# Patient Record
Sex: MALE | Race: WHITE | NOT HISPANIC OR LATINO | Employment: FULL TIME | URBAN - METROPOLITAN AREA
[De-identification: names, ages, dates, MRNs, and addresses within clinical notes are randomized per-mention and may not be internally consistent; named-entity substitution may affect disease eponyms.]

---

## 2017-09-19 PROBLEM — M54.12 CERVICAL RADICULOPATHY: Status: ACTIVE | Noted: 2017-09-19

## 2017-09-21 ENCOUNTER — HOSPITAL ENCOUNTER (OUTPATIENT)
Dept: RADIOLOGY | Facility: MEDICAL CENTER | Age: 42
End: 2017-09-21

## 2017-10-11 PROBLEM — M54.2 CERVICAL SPINE PAIN: Status: ACTIVE | Noted: 2017-10-11

## 2018-01-08 ENCOUNTER — HOSPITAL ENCOUNTER (OUTPATIENT)
Dept: HOSPITAL 8 - STAR | Age: 43
Discharge: HOME | End: 2018-01-08
Attending: NEUROLOGICAL SURGERY
Payer: COMMERCIAL

## 2018-01-08 DIAGNOSIS — M48.061: ICD-10-CM

## 2018-01-08 DIAGNOSIS — Z01.818: Primary | ICD-10-CM

## 2018-01-08 LAB
ALBUMIN SERPL-MCNC: 4 G/DL (ref 3.4–5)
ALP SERPL-CCNC: 59 U/L (ref 45–117)
ALT SERPL-CCNC: 37 U/L (ref 12–78)
ANION GAP SERPL CALC-SCNC: 5 MMOL/L (ref 5–15)
APTT BLD: 28 SECONDS (ref 25–31)
BASOPHILS # BLD AUTO: 0.04 X10^3/UL (ref 0–0.1)
BASOPHILS NFR BLD AUTO: 1 % (ref 0–1)
BILIRUB SERPL-MCNC: 1.1 MG/DL (ref 0.2–1)
CALCIUM SERPL-MCNC: 9.1 MG/DL (ref 8.5–10.1)
CHLORIDE SERPL-SCNC: 105 MMOL/L (ref 98–107)
CREAT SERPL-MCNC: 1.02 MG/DL (ref 0.7–1.3)
EOSINOPHIL # BLD AUTO: 0.22 X10^3/UL (ref 0–0.4)
EOSINOPHIL NFR BLD AUTO: 3 % (ref 1–7)
ERYTHROCYTE [DISTWIDTH] IN BLOOD BY AUTOMATED COUNT: 12.4 % (ref 9.4–14.8)
INR PPP: 1.01 (ref 0.93–1.1)
LYMPHOCYTES # BLD AUTO: 1.28 X10^3/UL (ref 1–3.4)
LYMPHOCYTES NFR BLD AUTO: 18 % (ref 22–44)
MCH RBC QN AUTO: 29.8 PG (ref 27.5–34.5)
MCHC RBC AUTO-ENTMCNC: 34.5 G/DL (ref 33.2–36.2)
MCV RBC AUTO: 86.4 FL (ref 81–97)
MD: NO
MONOCYTES # BLD AUTO: 0.61 X10^3/UL (ref 0.2–0.8)
MONOCYTES NFR BLD AUTO: 8 % (ref 2–9)
NEUTROPHILS # BLD AUTO: 5.06 X10^3/UL (ref 1.8–6.8)
NEUTROPHILS NFR BLD AUTO: 70 % (ref 42–75)
PLATELET # BLD AUTO: 280 X10^3/UL (ref 130–400)
PMV BLD AUTO: 7.3 FL (ref 7.4–10.4)
PROT SERPL-MCNC: 8 G/DL (ref 6.4–8.2)
PROTHROMBIN TIME: 10.5 SECONDS (ref 9.6–11.5)
RBC # BLD AUTO: 5.59 X10^6/UL (ref 4.38–5.82)

## 2018-01-08 PROCEDURE — 80053 COMPREHEN METABOLIC PANEL: CPT

## 2018-01-08 PROCEDURE — 85025 COMPLETE CBC W/AUTO DIFF WBC: CPT

## 2018-01-08 PROCEDURE — 93005 ELECTROCARDIOGRAM TRACING: CPT

## 2018-01-08 PROCEDURE — 71046 X-RAY EXAM CHEST 2 VIEWS: CPT

## 2018-01-08 PROCEDURE — 85730 THROMBOPLASTIN TIME PARTIAL: CPT

## 2018-01-08 PROCEDURE — 85610 PROTHROMBIN TIME: CPT

## 2018-01-08 PROCEDURE — 36415 COLL VENOUS BLD VENIPUNCTURE: CPT

## 2018-02-21 ENCOUNTER — HOSPITAL ENCOUNTER (INPATIENT)
Dept: HOSPITAL 8 - ORIP | Age: 43
LOS: 4 days | Discharge: HOME | DRG: 455 | End: 2018-02-25
Attending: NEUROLOGICAL SURGERY | Admitting: NEUROLOGICAL SURGERY
Payer: COMMERCIAL

## 2018-02-21 VITALS — DIASTOLIC BLOOD PRESSURE: 89 MMHG | SYSTOLIC BLOOD PRESSURE: 137 MMHG

## 2018-02-21 VITALS — WEIGHT: 269.63 LBS | HEIGHT: 75 IN | BODY MASS INDEX: 33.52 KG/M2

## 2018-02-21 VITALS — DIASTOLIC BLOOD PRESSURE: 64 MMHG | SYSTOLIC BLOOD PRESSURE: 101 MMHG

## 2018-02-21 DIAGNOSIS — Z79.899: ICD-10-CM

## 2018-02-21 DIAGNOSIS — Z88.8: ICD-10-CM

## 2018-02-21 DIAGNOSIS — M48.07: ICD-10-CM

## 2018-02-21 DIAGNOSIS — M51.17: Primary | ICD-10-CM

## 2018-02-21 DIAGNOSIS — K59.00: ICD-10-CM

## 2018-02-21 DIAGNOSIS — M47.817: ICD-10-CM

## 2018-02-21 PROCEDURE — C1776 JOINT DEVICE (IMPLANTABLE): HCPCS

## 2018-02-21 PROCEDURE — 0SB40ZZ EXCISION OF LUMBOSACRAL DISC, OPEN APPROACH: ICD-10-PCS | Performed by: SURGERY

## 2018-02-21 PROCEDURE — 0SG00A0 FUSION OF LUMBAR VERTEBRAL JOINT WITH INTERBODY FUSION DEVICE, ANTERIOR APPROACH, ANTERIOR COLUMN, OPEN APPROACH: ICD-10-PCS | Performed by: NEUROLOGICAL SURGERY

## 2018-02-21 PROCEDURE — 0SG30J1 FUSION OF LUMBOSACRAL JOINT WITH SYNTHETIC SUBSTITUTE, POSTERIOR APPROACH, POSTERIOR COLUMN, OPEN APPROACH: ICD-10-PCS | Performed by: NEUROLOGICAL SURGERY

## 2018-02-21 PROCEDURE — 72100 X-RAY EXAM L-S SPINE 2/3 VWS: CPT

## 2018-02-21 PROCEDURE — C1713 ANCHOR/SCREW BN/BN,TIS/BN: HCPCS

## 2018-02-21 PROCEDURE — 36415 COLL VENOUS BLD VENIPUNCTURE: CPT

## 2018-02-21 PROCEDURE — 86900 BLOOD TYPING SEROLOGIC ABO: CPT

## 2018-02-21 PROCEDURE — 0SB20ZZ EXCISION OF LUMBAR VERTEBRAL DISC, OPEN APPROACH: ICD-10-PCS | Performed by: SURGERY

## 2018-02-21 PROCEDURE — C1762 CONN TISS, HUMAN(INC FASCIA): HCPCS

## 2018-02-21 PROCEDURE — 01NB0ZZ RELEASE LUMBAR NERVE, OPEN APPROACH: ICD-10-PCS | Performed by: SURGERY

## 2018-02-21 PROCEDURE — 0SG30A0 FUSION OF LUMBOSACRAL JOINT WITH INTERBODY FUSION DEVICE, ANTERIOR APPROACH, ANTERIOR COLUMN, OPEN APPROACH: ICD-10-PCS | Performed by: SURGERY

## 2018-02-21 PROCEDURE — 0SG00J1 FUSION OF LUMBAR VERTEBRAL JOINT WITH SYNTHETIC SUBSTITUTE, POSTERIOR APPROACH, POSTERIOR COLUMN, OPEN APPROACH: ICD-10-PCS | Performed by: NEUROLOGICAL SURGERY

## 2018-02-21 PROCEDURE — 86850 RBC ANTIBODY SCREEN: CPT

## 2018-02-21 PROCEDURE — 74018 RADEX ABDOMEN 1 VIEW: CPT

## 2018-02-21 RX ADMIN — HYDROMORPHONE HYDROCHLORIDE PRN MG: 2 INJECTION INTRAMUSCULAR; INTRAVENOUS; SUBCUTANEOUS at 14:14

## 2018-02-21 RX ADMIN — FENTANYL CITRATE PRN MCG: 50 INJECTION INTRAMUSCULAR; INTRAVENOUS at 12:05

## 2018-02-21 RX ADMIN — HYDROMORPHONE HYDROCHLORIDE PRN MG: 1 INJECTION, SOLUTION INTRAMUSCULAR; INTRAVENOUS; SUBCUTANEOUS at 13:07

## 2018-02-21 RX ADMIN — SODIUM CHLORIDE AND POTASSIUM CHLORIDE SCH MLS/HR: .9; .15 SOLUTION INTRAVENOUS at 14:49

## 2018-02-21 RX ADMIN — HYDROCODONE BITARTRATE AND ACETAMINOPHEN PRN TAB: 10; 325 TABLET ORAL at 16:48

## 2018-02-21 RX ADMIN — CEFAZOLIN SODIUM SCH MLS/HR: 1 SOLUTION INTRAVENOUS at 16:41

## 2018-02-21 RX ADMIN — HYDROMORPHONE HYDROCHLORIDE PRN MG: 1 INJECTION, SOLUTION INTRAMUSCULAR; INTRAVENOUS; SUBCUTANEOUS at 12:44

## 2018-02-21 RX ADMIN — FENTANYL CITRATE PRN MCG: 50 INJECTION INTRAMUSCULAR; INTRAVENOUS at 11:53

## 2018-02-21 RX ADMIN — METHOCARBAMOL SCH MLS/HR: 100 INJECTION INTRAMUSCULAR; INTRAVENOUS at 22:07

## 2018-02-21 RX ADMIN — HYDROMORPHONE HYDROCHLORIDE PRN MG: 2 INJECTION INTRAMUSCULAR; INTRAVENOUS; SUBCUTANEOUS at 20:27

## 2018-02-22 VITALS — DIASTOLIC BLOOD PRESSURE: 68 MMHG | SYSTOLIC BLOOD PRESSURE: 108 MMHG

## 2018-02-22 VITALS — DIASTOLIC BLOOD PRESSURE: 59 MMHG | SYSTOLIC BLOOD PRESSURE: 102 MMHG

## 2018-02-22 VITALS — DIASTOLIC BLOOD PRESSURE: 68 MMHG | SYSTOLIC BLOOD PRESSURE: 106 MMHG

## 2018-02-22 VITALS — DIASTOLIC BLOOD PRESSURE: 80 MMHG | SYSTOLIC BLOOD PRESSURE: 144 MMHG

## 2018-02-22 VITALS — DIASTOLIC BLOOD PRESSURE: 76 MMHG | SYSTOLIC BLOOD PRESSURE: 129 MMHG

## 2018-02-22 RX ADMIN — HYDROCODONE BITARTRATE AND ACETAMINOPHEN PRN TAB: 10; 325 TABLET ORAL at 19:50

## 2018-02-22 RX ADMIN — DIPHENHYDRAMINE HYDROCHLORIDE PRN MG: 50 INJECTION, SOLUTION INTRAMUSCULAR; INTRAVENOUS at 20:03

## 2018-02-22 RX ADMIN — PREGABALIN SCH MG: 25 CAPSULE ORAL at 09:52

## 2018-02-22 RX ADMIN — PREGABALIN SCH MG: 25 CAPSULE ORAL at 15:56

## 2018-02-22 RX ADMIN — METHOCARBAMOL SCH MLS/HR: 100 INJECTION INTRAMUSCULAR; INTRAVENOUS at 22:12

## 2018-02-22 RX ADMIN — SODIUM CHLORIDE AND POTASSIUM CHLORIDE SCH MLS/HR: .9; .15 SOLUTION INTRAVENOUS at 05:21

## 2018-02-22 RX ADMIN — HYDROCODONE BITARTRATE AND ACETAMINOPHEN PRN TAB: 10; 325 TABLET ORAL at 02:12

## 2018-02-22 RX ADMIN — METHOCARBAMOL SCH MLS/HR: 100 INJECTION INTRAMUSCULAR; INTRAVENOUS at 06:06

## 2018-02-22 RX ADMIN — CEFAZOLIN SODIUM SCH MLS/HR: 1 SOLUTION INTRAVENOUS at 00:05

## 2018-02-22 RX ADMIN — HYDROCODONE BITARTRATE AND ACETAMINOPHEN PRN TAB: 10; 325 TABLET ORAL at 12:06

## 2018-02-22 RX ADMIN — PREGABALIN SCH MG: 25 CAPSULE ORAL at 19:51

## 2018-02-22 RX ADMIN — ENOXAPARIN SODIUM SCH MG: 30 INJECTION SUBCUTANEOUS at 12:06

## 2018-02-22 RX ADMIN — SODIUM CHLORIDE AND POTASSIUM CHLORIDE SCH MLS/HR: .9; .15 SOLUTION INTRAVENOUS at 15:58

## 2018-02-22 RX ADMIN — HYDROCODONE BITARTRATE AND ACETAMINOPHEN PRN TAB: 10; 325 TABLET ORAL at 07:51

## 2018-02-22 RX ADMIN — METHOCARBAMOL SCH MLS/HR: 100 INJECTION INTRAMUSCULAR; INTRAVENOUS at 15:11

## 2018-02-22 RX ADMIN — HYDROCODONE BITARTRATE AND ACETAMINOPHEN PRN TAB: 10; 325 TABLET ORAL at 15:56

## 2018-02-22 RX ADMIN — DOCUSATE SODIUM 50MG AND SENNOSIDES 8.6MG SCH TAB: 8.6; 5 TABLET, FILM COATED ORAL at 07:51

## 2018-02-23 VITALS — DIASTOLIC BLOOD PRESSURE: 77 MMHG | SYSTOLIC BLOOD PRESSURE: 124 MMHG

## 2018-02-23 VITALS — DIASTOLIC BLOOD PRESSURE: 58 MMHG | SYSTOLIC BLOOD PRESSURE: 116 MMHG

## 2018-02-23 VITALS — DIASTOLIC BLOOD PRESSURE: 73 MMHG | SYSTOLIC BLOOD PRESSURE: 123 MMHG

## 2018-02-23 VITALS — DIASTOLIC BLOOD PRESSURE: 73 MMHG | SYSTOLIC BLOOD PRESSURE: 119 MMHG

## 2018-02-23 RX ADMIN — HYDROCODONE BITARTRATE AND ACETAMINOPHEN PRN TAB: 10; 325 TABLET ORAL at 20:57

## 2018-02-23 RX ADMIN — ENOXAPARIN SODIUM SCH MG: 30 INJECTION SUBCUTANEOUS at 11:17

## 2018-02-23 RX ADMIN — HYDROCODONE BITARTRATE AND ACETAMINOPHEN PRN TAB: 10; 325 TABLET ORAL at 05:28

## 2018-02-23 RX ADMIN — PREGABALIN SCH MG: 25 CAPSULE ORAL at 16:56

## 2018-02-23 RX ADMIN — METHOCARBAMOL SCH MLS/HR: 100 INJECTION INTRAMUSCULAR; INTRAVENOUS at 14:18

## 2018-02-23 RX ADMIN — HYDROCODONE BITARTRATE AND ACETAMINOPHEN PRN TAB: 10; 325 TABLET ORAL at 16:56

## 2018-02-23 RX ADMIN — MAGNESIUM HYDROXIDE PRN ML: 1200 SUSPENSION ORAL at 20:57

## 2018-02-23 RX ADMIN — PREGABALIN SCH MG: 25 CAPSULE ORAL at 20:57

## 2018-02-23 RX ADMIN — DIPHENHYDRAMINE HYDROCHLORIDE PRN MG: 50 INJECTION, SOLUTION INTRAMUSCULAR; INTRAVENOUS at 20:57

## 2018-02-23 RX ADMIN — SODIUM CHLORIDE AND POTASSIUM CHLORIDE SCH MLS/HR: .9; .15 SOLUTION INTRAVENOUS at 14:30

## 2018-02-23 RX ADMIN — METHOCARBAMOL SCH MG: 750 TABLET ORAL at 20:57

## 2018-02-23 RX ADMIN — SODIUM CHLORIDE AND POTASSIUM CHLORIDE SCH MLS/HR: .9; .15 SOLUTION INTRAVENOUS at 00:35

## 2018-02-23 RX ADMIN — PREGABALIN SCH MG: 25 CAPSULE ORAL at 09:47

## 2018-02-23 RX ADMIN — METHOCARBAMOL SCH MLS/HR: 100 INJECTION INTRAMUSCULAR; INTRAVENOUS at 05:29

## 2018-02-23 RX ADMIN — HYDROCODONE BITARTRATE AND ACETAMINOPHEN PRN TAB: 10; 325 TABLET ORAL at 11:14

## 2018-02-23 RX ADMIN — ENOXAPARIN SODIUM SCH MG: 30 INJECTION SUBCUTANEOUS at 00:35

## 2018-02-23 RX ADMIN — HYDROCODONE BITARTRATE AND ACETAMINOPHEN PRN TAB: 10; 325 TABLET ORAL at 00:35

## 2018-02-23 RX ADMIN — DOCUSATE SODIUM 50MG AND SENNOSIDES 8.6MG SCH TAB: 8.6; 5 TABLET, FILM COATED ORAL at 09:48

## 2018-02-24 VITALS — DIASTOLIC BLOOD PRESSURE: 73 MMHG | SYSTOLIC BLOOD PRESSURE: 109 MMHG

## 2018-02-24 VITALS — DIASTOLIC BLOOD PRESSURE: 77 MMHG | SYSTOLIC BLOOD PRESSURE: 125 MMHG

## 2018-02-24 VITALS — SYSTOLIC BLOOD PRESSURE: 141 MMHG | DIASTOLIC BLOOD PRESSURE: 87 MMHG

## 2018-02-24 VITALS — DIASTOLIC BLOOD PRESSURE: 59 MMHG | SYSTOLIC BLOOD PRESSURE: 116 MMHG

## 2018-02-24 VITALS — DIASTOLIC BLOOD PRESSURE: 72 MMHG | SYSTOLIC BLOOD PRESSURE: 119 MMHG

## 2018-02-24 RX ADMIN — ENOXAPARIN SODIUM SCH MG: 30 INJECTION SUBCUTANEOUS at 22:48

## 2018-02-24 RX ADMIN — DIPHENHYDRAMINE HYDROCHLORIDE PRN MG: 25 CAPSULE ORAL at 21:25

## 2018-02-24 RX ADMIN — HYDROCODONE BITARTRATE AND ACETAMINOPHEN PRN TAB: 10; 325 TABLET ORAL at 00:47

## 2018-02-24 RX ADMIN — METHOCARBAMOL SCH MG: 750 TABLET ORAL at 12:38

## 2018-02-24 RX ADMIN — SODIUM CHLORIDE AND POTASSIUM CHLORIDE SCH MLS/HR: .9; .15 SOLUTION INTRAVENOUS at 00:29

## 2018-02-24 RX ADMIN — ENOXAPARIN SODIUM SCH MG: 30 INJECTION SUBCUTANEOUS at 00:29

## 2018-02-24 RX ADMIN — HYDROCODONE BITARTRATE AND ACETAMINOPHEN PRN TAB: 10; 325 TABLET ORAL at 12:39

## 2018-02-24 RX ADMIN — HYDROCODONE BITARTRATE AND ACETAMINOPHEN PRN TAB: 10; 325 TABLET ORAL at 05:55

## 2018-02-24 RX ADMIN — PREGABALIN SCH MG: 25 CAPSULE ORAL at 16:57

## 2018-02-24 RX ADMIN — HYDROCODONE BITARTRATE AND ACETAMINOPHEN PRN TAB: 10; 325 TABLET ORAL at 14:58

## 2018-02-24 RX ADMIN — PREGABALIN SCH MG: 25 CAPSULE ORAL at 10:04

## 2018-02-24 RX ADMIN — PREGABALIN SCH MG: 25 CAPSULE ORAL at 21:25

## 2018-02-24 RX ADMIN — SODIUM CHLORIDE AND POTASSIUM CHLORIDE SCH MLS/HR: .9; .15 SOLUTION INTRAVENOUS at 14:18

## 2018-02-24 RX ADMIN — ENOXAPARIN SODIUM SCH MG: 30 INJECTION SUBCUTANEOUS at 12:39

## 2018-02-24 RX ADMIN — HYDROCODONE BITARTRATE AND ACETAMINOPHEN PRN TAB: 10; 325 TABLET ORAL at 22:48

## 2018-02-24 RX ADMIN — DIPHENHYDRAMINE HYDROCHLORIDE PRN MG: 25 CAPSULE ORAL at 22:48

## 2018-02-24 RX ADMIN — METHOCARBAMOL SCH MG: 750 TABLET ORAL at 21:25

## 2018-02-24 RX ADMIN — METHOCARBAMOL SCH MG: 750 TABLET ORAL at 05:51

## 2018-02-24 RX ADMIN — HYDROCODONE BITARTRATE AND ACETAMINOPHEN PRN TAB: 10; 325 TABLET ORAL at 10:04

## 2018-02-24 RX ADMIN — HYDROCODONE BITARTRATE AND ACETAMINOPHEN PRN TAB: 10; 325 TABLET ORAL at 18:31

## 2018-02-24 RX ADMIN — MAGNESIUM HYDROXIDE PRN ML: 1200 SUSPENSION ORAL at 10:04

## 2018-02-24 RX ADMIN — DOCUSATE SODIUM 50MG AND SENNOSIDES 8.6MG SCH TAB: 8.6; 5 TABLET, FILM COATED ORAL at 10:04

## 2018-02-25 VITALS — DIASTOLIC BLOOD PRESSURE: 69 MMHG | SYSTOLIC BLOOD PRESSURE: 105 MMHG

## 2018-02-25 VITALS — DIASTOLIC BLOOD PRESSURE: 76 MMHG | SYSTOLIC BLOOD PRESSURE: 122 MMHG

## 2018-02-25 VITALS — DIASTOLIC BLOOD PRESSURE: 73 MMHG | SYSTOLIC BLOOD PRESSURE: 119 MMHG

## 2018-02-25 RX ADMIN — METHOCARBAMOL SCH MG: 750 TABLET ORAL at 05:02

## 2018-02-25 RX ADMIN — SODIUM CHLORIDE AND POTASSIUM CHLORIDE SCH MLS/HR: .9; .15 SOLUTION INTRAVENOUS at 01:51

## 2018-02-25 RX ADMIN — DOCUSATE SODIUM 50MG AND SENNOSIDES 8.6MG SCH TAB: 8.6; 5 TABLET, FILM COATED ORAL at 09:26

## 2018-02-25 RX ADMIN — HYDROCODONE BITARTRATE AND ACETAMINOPHEN PRN TAB: 10; 325 TABLET ORAL at 12:32

## 2018-02-25 RX ADMIN — ENOXAPARIN SODIUM SCH MG: 30 INJECTION SUBCUTANEOUS at 11:30

## 2018-02-25 RX ADMIN — PREGABALIN SCH MG: 25 CAPSULE ORAL at 09:27

## 2018-02-25 RX ADMIN — HYDROCODONE BITARTRATE AND ACETAMINOPHEN PRN TAB: 10; 325 TABLET ORAL at 11:30

## 2018-02-25 RX ADMIN — HYDROCODONE BITARTRATE AND ACETAMINOPHEN PRN TAB: 10; 325 TABLET ORAL at 05:02

## 2020-02-13 ENCOUNTER — APPOINTMENT (RX ONLY)
Dept: URBAN - METROPOLITAN AREA CLINIC 4 | Facility: CLINIC | Age: 45
Setting detail: DERMATOLOGY
End: 2020-02-13

## 2020-02-13 DIAGNOSIS — L82.1 OTHER SEBORRHEIC KERATOSIS: ICD-10-CM

## 2020-02-13 DIAGNOSIS — L72.8 OTHER FOLLICULAR CYSTS OF THE SKIN AND SUBCUTANEOUS TISSUE: ICD-10-CM

## 2020-02-13 DIAGNOSIS — L91.8 OTHER HYPERTROPHIC DISORDERS OF THE SKIN: ICD-10-CM

## 2020-02-13 PROBLEM — D48.5 NEOPLASM OF UNCERTAIN BEHAVIOR OF SKIN: Status: ACTIVE | Noted: 2020-02-13

## 2020-02-13 PROCEDURE — 11102 TANGNTL BX SKIN SINGLE LES: CPT

## 2020-02-13 PROCEDURE — ? ADDITIONAL NOTES

## 2020-02-13 PROCEDURE — ? COUNSELING

## 2020-02-13 PROCEDURE — ? BIOPSY BY SHAVE METHOD

## 2020-02-13 PROCEDURE — 99202 OFFICE O/P NEW SF 15 MIN: CPT | Mod: 25

## 2020-02-13 ASSESSMENT — LOCATION SIMPLE DESCRIPTION DERM
LOCATION SIMPLE: LEFT AXILLARY VAULT
LOCATION SIMPLE: LEFT PRETIBIAL REGION
LOCATION SIMPLE: SCALP

## 2020-02-13 ASSESSMENT — LOCATION DETAILED DESCRIPTION DERM
LOCATION DETAILED: LEFT PROXIMAL PRETIBIAL REGION
LOCATION DETAILED: LEFT AXILLARY VAULT
LOCATION DETAILED: LEFT CENTRAL PARIETAL SCALP

## 2020-02-13 ASSESSMENT — LOCATION ZONE DERM
LOCATION ZONE: SCALP
LOCATION ZONE: LEG
LOCATION ZONE: AXILLAE

## 2020-02-13 NOTE — PROCEDURE: MIPS QUALITY
Quality 110: Preventive Care And Screening: Influenza Immunization: Influenza Immunization not Administered for Documented Reasons.
Detail Level: Generalized
Quality 226: Preventive Care And Screening: Tobacco Use: Screening And Cessation Intervention: Patient screened for tobacco use and is an ex/non-smoker
Quality 130: Documentation Of Current Medications In The Medical Record: Current Medications Documented

## 2020-02-13 NOTE — PROCEDURE: BIOPSY BY SHAVE METHOD
Detail Level: Detailed
Depth Of Biopsy: dermis
Was A Bandage Applied: Yes
Size Of Lesion In Cm: 0.8
X Size Of Lesion In Cm: 0
Biopsy Type: H and E
Biopsy Method: Dermablade
Anesthesia Type: 1% lidocaine with 1:100,000 epinephrine and a 1:12 solution of 8.4% sodium bicarbonate
Hemostasis: Pressure
Wound Care: Petrolatum
Dressing: bandage
Destruction After The Procedure: No
Type Of Destruction Used: Curettage
Curettage Text: The wound bed was treated with curettage after the biopsy was performed.
Cryotherapy Text: The wound bed was treated with cryotherapy after the biopsy was performed.
Electrodesiccation Text: The wound bed was treated with electrodesiccation after the biopsy was performed.
Electrodesiccation And Curettage Text: The wound bed was treated with electrodesiccation and curettage after the biopsy was performed.
Silver Nitrate Text: The wound bed was treated with silver nitrate after the biopsy was performed.
Lab: [id number]
Lab Facility: 
Consent: Written consent was obtained and risks were reviewed including but not limited to scarring, infection, bleeding, scabbing, incomplete removal, nerve damage and allergy to anesthesia.
Post-Care Instructions: I reviewed with the patient in detail post-care instructions. Patient is to keep the biopsy site dry overnight, and then apply bacitracin twice daily until healed. Patient may apply hydrogen peroxide soaks to remove any crusting.
Notification Instructions: Patient will be notified of biopsy results. However, patient instructed to call the office if not contacted within 2 weeks.
Billing Type: Third-Party Bill

## 2020-02-13 NOTE — HPI: BUMPS
How Severe Are Your Bumps?: mild
Have Your Bumps Been Treated?: not been treated
Is This A New Presentation, Or A Follow-Up?: Bump
Additional History: Patient states he has had previous cysts on scalp removed here.

## 2020-02-21 ENCOUNTER — APPOINTMENT (RX ONLY)
Dept: URBAN - METROPOLITAN AREA CLINIC 4 | Facility: CLINIC | Age: 45
Setting detail: DERMATOLOGY
End: 2020-02-21

## 2020-02-21 DIAGNOSIS — L72.8 OTHER FOLLICULAR CYSTS OF THE SKIN AND SUBCUTANEOUS TISSUE: ICD-10-CM

## 2020-02-21 PROBLEM — D48.5 NEOPLASM OF UNCERTAIN BEHAVIOR OF SKIN: Status: ACTIVE | Noted: 2020-02-21

## 2020-02-21 PROCEDURE — ? EXCISION

## 2020-02-21 PROCEDURE — 11422 EXC H-F-NK-SP B9+MARG 1.1-2: CPT

## 2020-02-21 PROCEDURE — 13121 CMPLX RPR S/A/L 2.6-7.5 CM: CPT

## 2020-02-21 ASSESSMENT — LOCATION DETAILED DESCRIPTION DERM: LOCATION DETAILED: LEFT CENTRAL PARIETAL SCALP

## 2020-02-21 ASSESSMENT — LOCATION SIMPLE DESCRIPTION DERM: LOCATION SIMPLE: SCALP

## 2020-02-21 ASSESSMENT — LOCATION ZONE DERM: LOCATION ZONE: SCALP

## 2020-02-21 NOTE — PROCEDURE: EXCISION
Medical Necessity Information: It is in your best interest to select a reason for this procedure from the list below. All of these items fulfill various CMS LCD requirements except lesion extends to a margin.
Include Z78.9 (Other Specified Conditions Influencing Health Status) As An Associated Diagnosis?: No
Medical Necessity Clause: This procedure was medically necessary because the lesion that was treated was:
Lab: [id number]
Lab Facility: 
Surgeon (Optional): Kell
Biopsy Photograph Reviewed: Yes
Size Of Lesion In Cm: 1.1
X Size Of Lesion In Cm (Optional): 0
Size Of Margin In Cm: 0.2
Excision Method: Elliptical
Repair Type: Complex
Intermediate / Complex Repair - Final Wound Length In Cm: 2.6
Undermining Type: Entire Wound
Debridement Text: The wound edges were debrided prior to proceeding with the closure to facilitate wound healing.
Helical Rim Text: The closure involved the helical rim.
Vermilion Border Text: The closure involved the vermilion border.
Nostril Rim Text: The closure involved the nostril rim.
Retention Suture Text: Retention sutures were placed to support the closure and prevent dehiscence.
Epidermal Closure Graft Donor Site (Optional): simple interrupted
Graft Donor Site Bandage (Optional-Leave Blank If You Don't Want In Note): Steri-strips and a pressure bandage were applied to the donor site.
Detail Level: Detailed
Excision Depth: adipose tissue
Scalpel Size: 15 blade
Anesthesia Type: 1% lidocaine with 1:100,000 epinephrine and 408mcg clindamycin/ml and a 1:10 solution of 8.4% sodium bicarbonate
Additional Anesthesia Volume In Cc: 6
Hemostasis: Electrocautery
Estimated Blood Loss (Cc): minimal
Repair Anesthesia Method: local infiltration
Deep Sutures: 3-0 Vicryl
Dermal Closure: buried vertical mattress
Epidermal Sutures: 5-0 Caprosyn
Wound Care: Bacitracin
Dressing: steri-strips
Complex Repair Preamble Text (Leave Blank If You Do Not Want): Extensive wide undermining was performed.
Intermediate Repair Preamble Text (Leave Blank If You Do Not Want): Undermining was performed with blunt dissection.
Fusiform Excision Additional Text (Leave Blank If You Do Not Want): The margin was drawn around the clinically apparent lesion.  A fusiform shape was then drawn on the skin incorporating the lesion and margins.  Incisions were then made along these lines to the appropriate tissue plane and the lesion was extirpated.
Eliptical Excision Additional Text (Leave Blank If You Do Not Want): The margin was drawn around the clinically apparent lesion.  An elliptical shape was then drawn on the skin incorporating the lesion and margins.  Incisions were then made along these lines to the appropriate tissue plane and the lesion was extirpated.
Saucerization Excision Additional Text (Leave Blank If You Do Not Want): The margin was drawn around the clinically apparent lesion.  Incisions were then made along these lines, in a tangential fashion, to the appropriate tissue plane and the lesion was extirpated.
Slit Excision Additional Text (Leave Blank If You Do Not Want): A linear line was drawn on the skin overlying the lesion. An incision was made slowly until the lesion was visualized.  Once visualized, the lesion was removed with blunt dissection.
Excisional Biopsy Additional Text (Leave Blank If You Do Not Want): The margin was drawn around the clinically apparent lesion. An elliptical shape was then drawn on the skin incorporating the lesion and margins.  Incisions were then made along these lines to the appropriate tissue plane and the lesion was extirpated.
Perilesional Excision Additional Text (Leave Blank If You Do Not Want): The margin was drawn around the clinically apparent lesion. Incisions were then made along these lines to the appropriate tissue plane and the lesion was extirpated.
Repair Performed By Another Provider Text (Leave Blank If You Do Not Want): After the tissue was excised the defect was repaired by another provider.
No Repair - Repaired With Adjacent Surgical Defect Text (Leave Blank If You Do Not Want): After the excision the defect was repaired concurrently with another surgical defect which was in close approximation.
Advancement Flap (Single) Text: The defect edges were debeveled with a #15 scalpel blade.  Given the location of the defect and the proximity to free margins a single advancement flap was deemed most appropriate.  Using a sterile surgical marker, an appropriate advancement flap was drawn incorporating the defect and placing the expected incisions within the relaxed skin tension lines where possible.    The area thus outlined was incised deep to adipose tissue with a #15 scalpel blade.  The skin margins were undermined to an appropriate distance in all directions utilizing iris scissors.
Advancement Flap (Double) Text: The defect edges were debeveled with a #15 scalpel blade.  Given the location of the defect and the proximity to free margins a double advancement flap was deemed most appropriate.  Using a sterile surgical marker, the appropriate advancement flaps were drawn incorporating the defect and placing the expected incisions within the relaxed skin tension lines where possible.    The area thus outlined was incised deep to adipose tissue with a #15 scalpel blade.  The skin margins were undermined to an appropriate distance in all directions utilizing iris scissors.
Burow's Advancement Flap Text: The defect edges were debeveled with a #15 scalpel blade.  Given the location of the defect and the proximity to free margins a Burow's advancement flap was deemed most appropriate.  Using a sterile surgical marker, the appropriate advancement flap was drawn incorporating the defect and placing the expected incisions within the relaxed skin tension lines where possible.    The area thus outlined was incised deep to adipose tissue with a #15 scalpel blade.  The skin margins were undermined to an appropriate distance in all directions utilizing iris scissors.
Chonodrocutaneous Helical Advancement Flap Text: The defect edges were debeveled with a #15 scalpel blade.  Given the location of the defect and the proximity to free margins a chondrocutaneous helical advancement flap was deemed most appropriate.  Using a sterile surgical marker, the appropriate advancement flap was drawn incorporating the defect and placing the expected incisions within the relaxed skin tension lines where possible.    The area thus outlined was incised deep to adipose tissue with a #15 scalpel blade.  The skin margins were undermined to an appropriate distance in all directions utilizing iris scissors.
Crescentic Advancement Flap Text: The defect edges were debeveled with a #15 scalpel blade.  Given the location of the defect and the proximity to free margins a crescentic advancement flap was deemed most appropriate.  Using a sterile surgical marker, the appropriate advancement flap was drawn incorporating the defect and placing the expected incisions within the relaxed skin tension lines where possible.    The area thus outlined was incised deep to adipose tissue with a #15 scalpel blade.  The skin margins were undermined to an appropriate distance in all directions utilizing iris scissors.
A-T Advancement Flap Text: The defect edges were debeveled with a #15 scalpel blade.  Given the location of the defect, shape of the defect and the proximity to free margins an A-T advancement flap was deemed most appropriate.  Using a sterile surgical marker, an appropriate advancement flap was drawn incorporating the defect and placing the expected incisions within the relaxed skin tension lines where possible.    The area thus outlined was incised deep to adipose tissue with a #15 scalpel blade.  The skin margins were undermined to an appropriate distance in all directions utilizing iris scissors.
O-T Advancement Flap Text: The defect edges were debeveled with a #15 scalpel blade.  Given the location of the defect, shape of the defect and the proximity to free margins an O-T advancement flap was deemed most appropriate.  Using a sterile surgical marker, an appropriate advancement flap was drawn incorporating the defect and placing the expected incisions within the relaxed skin tension lines where possible.    The area thus outlined was incised deep to adipose tissue with a #15 scalpel blade.  The skin margins were undermined to an appropriate distance in all directions utilizing iris scissors.
O-L Flap Text: The defect edges were debeveled with a #15 scalpel blade.  Given the location of the defect, shape of the defect and the proximity to free margins an O-L flap was deemed most appropriate.  Using a sterile surgical marker, an appropriate advancement flap was drawn incorporating the defect and placing the expected incisions within the relaxed skin tension lines where possible.    The area thus outlined was incised deep to adipose tissue with a #15 scalpel blade.  The skin margins were undermined to an appropriate distance in all directions utilizing iris scissors.
O-Z Flap Text: The defect edges were debeveled with a #15 scalpel blade.  Given the location of the defect, shape of the defect and the proximity to free margins an O-Z flap was deemed most appropriate.  Using a sterile surgical marker, an appropriate transposition flap was drawn incorporating the defect and placing the expected incisions within the relaxed skin tension lines where possible. The area thus outlined was incised deep to adipose tissue with a #15 scalpel blade.  The skin margins were undermined to an appropriate distance in all directions utilizing iris scissors.
Double O-Z Flap Text: The defect edges were debeveled with a #15 scalpel blade.  Given the location of the defect, shape of the defect and the proximity to free margins a Double O-Z flap was deemed most appropriate.  Using a sterile surgical marker, an appropriate transposition flap was drawn incorporating the defect and placing the expected incisions within the relaxed skin tension lines where possible. The area thus outlined was incised deep to adipose tissue with a #15 scalpel blade.  The skin margins were undermined to an appropriate distance in all directions utilizing iris scissors.
V-Y Flap Text: The defect edges were debeveled with a #15 scalpel blade.  Given the location of the defect, shape of the defect and the proximity to free margins a V-Y flap was deemed most appropriate.  Using a sterile surgical marker, an appropriate advancement flap was drawn incorporating the defect and placing the expected incisions within the relaxed skin tension lines where possible.    The area thus outlined was incised deep to adipose tissue with a #15 scalpel blade.  The skin margins were undermined to an appropriate distance in all directions utilizing iris scissors.
Mercedes Flap Text: The defect edges were debeveled with a #15 scalpel blade.  Given the location of the defect, shape of the defect and the proximity to free margins a Mercedes flap was deemed most appropriate.  Using a sterile surgical marker, an appropriate advancement flap was drawn incorporating the defect and placing the expected incisions within the relaxed skin tension lines where possible. The area thus outlined was incised deep to adipose tissue with a #15 scalpel blade.  The skin margins were undermined to an appropriate distance in all directions utilizing iris scissors.
Modified Advancement Flap Text: The defect edges were debeveled with a #15 scalpel blade.  Given the location of the defect, shape of the defect and the proximity to free margins a modified advancement flap was deemed most appropriate.  Using a sterile surgical marker, an appropriate advancement flap was drawn incorporating the defect and placing the expected incisions within the relaxed skin tension lines where possible.    The area thus outlined was incised deep to adipose tissue with a #15 scalpel blade.  The skin margins were undermined to an appropriate distance in all directions utilizing iris scissors.
Mucosal Advancement Flap Text: Given the location of the defect, shape of the defect and the proximity to free margins a mucosal advancement flap was deemed most appropriate. Incisions were made with a 15 blade scalpel in the appropriate fashion along the cutaneous vermillion border and the mucosal lip. The remaining actinically damaged mucosal tissue was excised.  The mucosal advancement flap was then elevated to the gingival sulcus with care taken to preserve the neurovascular structures and advanced into the primary defect. Care was taken to ensure that precise realignment of the vermilion border was achieved.
Hatchet Flap Text: The defect edges were debeveled with a #15 scalpel blade.  Given the location of the defect, shape of the defect and the proximity to free margins a hatchet flap was deemed most appropriate.  Using a sterile surgical marker, an appropriate hatchet flap was drawn incorporating the defect and placing the expected incisions within the relaxed skin tension lines where possible.    The area thus outlined was incised deep to adipose tissue with a #15 scalpel blade.  The skin margins were undermined to an appropriate distance in all directions utilizing iris scissors.
Rotation Flap Text: The defect edges were debeveled with a #15 scalpel blade.  Given the location of the defect, shape of the defect and the proximity to free margins a rotation flap was deemed most appropriate.  Using a sterile surgical marker, an appropriate rotation flap was drawn incorporating the defect and placing the expected incisions within the relaxed skin tension lines where possible.    The area thus outlined was incised deep to adipose tissue with a #15 scalpel blade.  The skin margins were undermined to an appropriate distance in all directions utilizing iris scissors.
Spiral Flap Text: The defect edges were debeveled with a #15 scalpel blade.  Given the location of the defect, shape of the defect and the proximity to free margins a spiral flap was deemed most appropriate.  Using a sterile surgical marker, an appropriate rotation flap was drawn incorporating the defect and placing the expected incisions within the relaxed skin tension lines where possible. The area thus outlined was incised deep to adipose tissue with a #15 scalpel blade.  The skin margins were undermined to an appropriate distance in all directions utilizing iris scissors.
Star Wedge Flap Text: The defect edges were debeveled with a #15 scalpel blade.  Given the location of the defect, shape of the defect and the proximity to free margins a star wedge flap was deemed most appropriate.  Using a sterile surgical marker, an appropriate rotation flap was drawn incorporating the defect and placing the expected incisions within the relaxed skin tension lines where possible. The area thus outlined was incised deep to adipose tissue with a #15 scalpel blade.  The skin margins were undermined to an appropriate distance in all directions utilizing iris scissors.
Transposition Flap Text: The defect edges were debeveled with a #15 scalpel blade.  Given the location of the defect and the proximity to free margins a transposition flap was deemed most appropriate.  Using a sterile surgical marker, an appropriate transposition flap was drawn incorporating the defect.    The area thus outlined was incised deep to adipose tissue with a #15 scalpel blade.  The skin margins were undermined to an appropriate distance in all directions utilizing iris scissors.
Muscle Hinge Flap Text: The defect edges were debeveled with a #15 scalpel blade.  Given the size, depth and location of the defect and the proximity to free margins a muscle hinge flap was deemed most appropriate.  Using a sterile surgical marker, an appropriate hinge flap was drawn incorporating the defect. The area thus outlined was incised with a #15 scalpel blade.  The skin margins were undermined to an appropriate distance in all directions utilizing iris scissors.
Melolabial Transposition Flap Text: The defect edges were debeveled with a #15 scalpel blade.  Given the location of the defect and the proximity to free margins a melolabial flap was deemed most appropriate.  Using a sterile surgical marker, an appropriate melolabial transposition flap was drawn incorporating the defect.    The area thus outlined was incised deep to adipose tissue with a #15 scalpel blade.  The skin margins were undermined to an appropriate distance in all directions utilizing iris scissors.
Rhombic Flap Text: The defect edges were debeveled with a #15 scalpel blade.  Given the location of the defect and the proximity to free margins a rhombic flap was deemed most appropriate.  Using a sterile surgical marker, an appropriate rhombic flap was drawn incorporating the defect.    The area thus outlined was incised deep to adipose tissue with a #15 scalpel blade.  The skin margins were undermined to an appropriate distance in all directions utilizing iris scissors.
Rhomboid Transposition Flap Text: The defect edges were debeveled with a #15 scalpel blade.  Given the location of the defect and the proximity to free margins a rhomboid transposition flap was deemed most appropriate.  Using a sterile surgical marker, an appropriate rhomboid flap was drawn incorporating the defect.    The area thus outlined was incised deep to adipose tissue with a #15 scalpel blade.  The skin margins were undermined to an appropriate distance in all directions utilizing iris scissors.
Bi-Rhombic Flap Text: The defect edges were debeveled with a #15 scalpel blade.  Given the location of the defect and the proximity to free margins a bi-rhombic flap was deemed most appropriate.  Using a sterile surgical marker, an appropriate rhombic flap was drawn incorporating the defect. The area thus outlined was incised deep to adipose tissue with a #15 scalpel blade.  The skin margins were undermined to an appropriate distance in all directions utilizing iris scissors.
Helical Rim Advancement Flap Text: The defect edges were debeveled with a #15 blade scalpel.  Given the location of the defect and the proximity to free margins (helical rim) a double helical rim advancement flap was deemed most appropriate.  Using a sterile surgical marker, the appropriate advancement flaps were drawn incorporating the defect and placing the expected incisions between the helical rim and antihelix where possible.  The area thus outlined was incised through and through with a #15 scalpel blade.  With a skin hook and iris scissors, the flaps were gently and sharply undermined and freed up.
Bilateral Helical Rim Advancement Flap Text: The defect edges were debeveled with a #15 blade scalpel.  Given the location of the defect and the proximity to free margins (helical rim) a bilateral helical rim advancement flap was deemed most appropriate.  Using a sterile surgical marker, the appropriate advancement flaps were drawn incorporating the defect and placing the expected incisions between the helical rim and antihelix where possible.  The area thus outlined was incised through and through with a #15 scalpel blade.  With a skin hook and iris scissors, the flaps were gently and sharply undermined and freed up.
Ear Star Wedge Flap Text: The defect edges were debeveled with a #15 blade scalpel.  Given the location of the defect and the proximity to free margins (helical rim) an ear star wedge flap was deemed most appropriate.  Using a sterile surgical marker, the appropriate flap was drawn incorporating the defect and placing the expected incisions between the helical rim and antihelix where possible.  The area thus outlined was incised through and through with a #15 scalpel blade.
Banner Transposition Flap Text: The defect edges were debeveled with a #15 scalpel blade.  Given the location of the defect and the proximity to free margins a Banner transposition flap was deemed most appropriate.  Using a sterile surgical marker, an appropriate flap drawn around the defect. The area thus outlined was incised deep to adipose tissue with a #15 scalpel blade.  The skin margins were undermined to an appropriate distance in all directions utilizing iris scissors.
Bilobed Flap Text: The defect edges were debeveled with a #15 scalpel blade.  Given the location of the defect and the proximity to free margins a bilobe flap was deemed most appropriate.  Using a sterile surgical marker, an appropriate bilobe flap drawn around the defect.    The area thus outlined was incised deep to adipose tissue with a #15 scalpel blade.  The skin margins were undermined to an appropriate distance in all directions utilizing iris scissors.
Bilobed Transposition Flap Text: The defect edges were debeveled with a #15 scalpel blade.  Given the location of the defect and the proximity to free margins a bilobed transposition flap was deemed most appropriate.  Using a sterile surgical marker, an appropriate bilobe flap drawn around the defect.    The area thus outlined was incised deep to adipose tissue with a #15 scalpel blade.  The skin margins were undermined to an appropriate distance in all directions utilizing iris scissors.
Trilobed Flap Text: The defect edges were debeveled with a #15 scalpel blade.  Given the location of the defect and the proximity to free margins a trilobed flap was deemed most appropriate.  Using a sterile surgical marker, an appropriate trilobed flap drawn around the defect.    The area thus outlined was incised deep to adipose tissue with a #15 scalpel blade.  The skin margins were undermined to an appropriate distance in all directions utilizing iris scissors.
Dorsal Nasal Flap Text: The defect edges were debeveled with a #15 scalpel blade.  Given the location of the defect and the proximity to free margins a dorsal nasal flap was deemed most appropriate.  Using a sterile surgical marker, an appropriate dorsal nasal flap was drawn around the defect.    The area thus outlined was incised deep to adipose tissue with a #15 scalpel blade.  The skin margins were undermined to an appropriate distance in all directions utilizing iris scissors.
Island Pedicle Flap Text: The defect edges were debeveled with a #15 scalpel blade.  Given the location of the defect, shape of the defect and the proximity to free margins an island pedicle advancement flap was deemed most appropriate.  Using a sterile surgical marker, an appropriate advancement flap was drawn incorporating the defect, outlining the appropriate donor tissue and placing the expected incisions within the relaxed skin tension lines where possible.    The area thus outlined was incised deep to adipose tissue with a #15 scalpel blade.  The skin margins were undermined to an appropriate distance in all directions around the primary defect and laterally outward around the island pedicle utilizing iris scissors.  There was minimal undermining beneath the pedicle flap.
Island Pedicle Flap With Canthal Suspension Text: The defect edges were debeveled with a #15 scalpel blade.  Given the location of the defect, shape of the defect and the proximity to free margins an island pedicle advancement flap was deemed most appropriate.  Using a sterile surgical marker, an appropriate advancement flap was drawn incorporating the defect, outlining the appropriate donor tissue and placing the expected incisions within the relaxed skin tension lines where possible. The area thus outlined was incised deep to adipose tissue with a #15 scalpel blade.  The skin margins were undermined to an appropriate distance in all directions around the primary defect and laterally outward around the island pedicle utilizing iris scissors.  There was minimal undermining beneath the pedicle flap. A suspension suture was placed in the canthal tendon to prevent tension and prevent ectropion.
Alar Island Pedicle Flap Text: The defect edges were debeveled with a #15 scalpel blade.  Given the location of the defect, shape of the defect and the proximity to the alar rim an island pedicle advancement flap was deemed most appropriate.  Using a sterile surgical marker, an appropriate advancement flap was drawn incorporating the defect, outlining the appropriate donor tissue and placing the expected incisions within the nasal ala running parallel to the alar rim. The area thus outlined was incised with a #15 scalpel blade.  The skin margins were undermined minimally to an appropriate distance in all directions around the primary defect and laterally outward around the island pedicle utilizing iris scissors.  There was minimal undermining beneath the pedicle flap.
Double Island Pedicle Flap Text: The defect edges were debeveled with a #15 scalpel blade.  Given the location of the defect, shape of the defect and the proximity to free margins a double island pedicle advancement flap was deemed most appropriate.  Using a sterile surgical marker, an appropriate advancement flap was drawn incorporating the defect, outlining the appropriate donor tissue and placing the expected incisions within the relaxed skin tension lines where possible.    The area thus outlined was incised deep to adipose tissue with a #15 scalpel blade.  The skin margins were undermined to an appropriate distance in all directions around the primary defect and laterally outward around the island pedicle utilizing iris scissors.  There was minimal undermining beneath the pedicle flap.
Island Pedicle Flap-Requiring Vessel Identification Text: The defect edges were debeveled with a #15 scalpel blade.  Given the location of the defect, shape of the defect and the proximity to free margins an island pedicle advancement flap was deemed most appropriate.  Using a sterile surgical marker, an appropriate advancement flap was drawn, based on the axial vessel mentioned above, incorporating the defect, outlining the appropriate donor tissue and placing the expected incisions within the relaxed skin tension lines where possible.    The area thus outlined was incised deep to adipose tissue with a #15 scalpel blade.  The skin margins were undermined to an appropriate distance in all directions around the primary defect and laterally outward around the island pedicle utilizing iris scissors.  There was minimal undermining beneath the pedicle flap.
Keystone Flap Text: The defect edges were debeveled with a #15 scalpel blade.  Given the location of the defect, shape of the defect a keystone flap was deemed most appropriate.  Using a sterile surgical marker, an appropriate keystone flap was drawn incorporating the defect, outlining the appropriate donor tissue and placing the expected incisions within the relaxed skin tension lines where possible. The area thus outlined was incised deep to adipose tissue with a #15 scalpel blade.  The skin margins were undermined to an appropriate distance in all directions around the primary defect and laterally outward around the flap utilizing iris scissors.
O-T Plasty Text: The defect edges were debeveled with a #15 scalpel blade.  Given the location of the defect, shape of the defect and the proximity to free margins an O-T plasty was deemed most appropriate.  Using a sterile surgical marker, an appropriate O-T plasty was drawn incorporating the defect and placing the expected incisions within the relaxed skin tension lines where possible.    The area thus outlined was incised deep to adipose tissue with a #15 scalpel blade.  The skin margins were undermined to an appropriate distance in all directions utilizing iris scissors.
O-Z Plasty Text: The defect edges were debeveled with a #15 scalpel blade.  Given the location of the defect, shape of the defect and the proximity to free margins an O-Z plasty (double transposition flap) was deemed most appropriate.  Using a sterile surgical marker, the appropriate transposition flaps were drawn incorporating the defect and placing the expected incisions within the relaxed skin tension lines where possible.    The area thus outlined was incised deep to adipose tissue with a #15 scalpel blade.  The skin margins were undermined to an appropriate distance in all directions utilizing iris scissors.  Hemostasis was achieved with electrocautery.  The flaps were then transposed into place, one clockwise and the other counterclockwise, and anchored with interrupted buried subcutaneous sutures.
Double O-Z Plasty Text: The defect edges were debeveled with a #15 scalpel blade.  Given the location of the defect, shape of the defect and the proximity to free margins a Double O-Z plasty (double transposition flap) was deemed most appropriate.  Using a sterile surgical marker, the appropriate transposition flaps were drawn incorporating the defect and placing the expected incisions within the relaxed skin tension lines where possible. The area thus outlined was incised deep to adipose tissue with a #15 scalpel blade.  The skin margins were undermined to an appropriate distance in all directions utilizing iris scissors.  Hemostasis was achieved with electrocautery.  The flaps were then transposed into place, one clockwise and the other counterclockwise, and anchored with interrupted buried subcutaneous sutures.
S Plasty Text: Given the location and shape of the defect, and the orientation of relaxed skin tension lines, an S-plasty was deemed most appropriate for repair.  Using a sterile surgical marker, the appropriate outline of the S-plasty was drawn, incorporating the defect and placing the expected incisions within the relaxed skin tension lines where possible.  The area thus outlined was incised deep to adipose tissue with a #15 scalpel blade.  The skin margins were undermined to an appropriate distance in all directions utilizing iris scissors. The skin flaps were advanced over the defect.  The opposing margins were then approximated with interrupted buried subcutaneous sutures.
V-Y Plasty Text: The defect edges were debeveled with a #15 scalpel blade.  Given the location of the defect, shape of the defect and the proximity to free margins an V-Y advancement flap was deemed most appropriate.  Using a sterile surgical marker, an appropriate advancement flap was drawn incorporating the defect and placing the expected incisions within the relaxed skin tension lines where possible.    The area thus outlined was incised deep to adipose tissue with a #15 scalpel blade.  The skin margins were undermined to an appropriate distance in all directions utilizing iris scissors.
H Plasty Text: Given the location of the defect, shape of the defect and the proximity to free margins a H-plasty was deemed most appropriate for repair.  Using a sterile surgical marker, the appropriate advancement arms of the H-plasty were drawn incorporating the defect and placing the expected incisions within the relaxed skin tension lines where possible. The area thus outlined was incised deep to adipose tissue with a #15 scalpel blade. The skin margins were undermined to an appropriate distance in all directions utilizing iris scissors.  The opposing advancement arms were then advanced into place in opposite direction and anchored with interrupted buried subcutaneous sutures.
W Plasty Text: The lesion was extirpated to the level of the fat with a #15 scalpel blade.  Given the location of the defect, shape of the defect and the proximity to free margins a W-plasty was deemed most appropriate for repair.  Using a sterile surgical marker, the appropriate transposition arms of the W-plasty were drawn incorporating the defect and placing the expected incisions within the relaxed skin tension lines where possible.    The area thus outlined was incised deep to adipose tissue with a #15 scalpel blade.  The skin margins were undermined to an appropriate distance in all directions utilizing iris scissors.  The opposing transposition arms were then transposed into place in opposite direction and anchored with interrupted buried subcutaneous sutures.
Z Plasty Text: The lesion was extirpated to the level of the fat with a #15 scalpel blade.  Given the location of the defect, shape of the defect and the proximity to free margins a Z-plasty was deemed most appropriate for repair.  Using a sterile surgical marker, the appropriate transposition arms of the Z-plasty were drawn incorporating the defect and placing the expected incisions within the relaxed skin tension lines where possible.    The area thus outlined was incised deep to adipose tissue with a #15 scalpel blade.  The skin margins were undermined to an appropriate distance in all directions utilizing iris scissors.  The opposing transposition arms were then transposed into place in opposite direction and anchored with interrupted buried subcutaneous sutures.
Cheek Interpolation Flap Text: A decision was made to reconstruct the defect utilizing an interpolation axial flap and a staged reconstruction.  A telfa template was made of the defect.  This telfa template was then used to outline the Cheek Interpolation flap.  The donor area for the pedicle flap was then injected with anesthesia.  The flap was excised through the skin and subcutaneous tissue down to the layer of the underlying musculature.  The interpolation flap was carefully excised within this deep plane to maintain its blood supply.  The edges of the donor site were undermined.   The donor site was closed in a primary fashion.  The pedicle was then rotated into position and sutured.  Once the tube was sutured into place, adequate blood supply was confirmed with blanching and refill.  The pedicle was then wrapped with xeroform gauze and dressed appropriately with a telfa and gauze bandage to ensure continued blood supply and protect the attached pedicle.
Cheek-To-Nose Interpolation Flap Text: A decision was made to reconstruct the defect utilizing an interpolation axial flap and a staged reconstruction.  A telfa template was made of the defect.  This telfa template was then used to outline the Cheek-To-Nose Interpolation flap.  The donor area for the pedicle flap was then injected with anesthesia.  The flap was excised through the skin and subcutaneous tissue down to the layer of the underlying musculature.  The interpolation flap was carefully excised within this deep plane to maintain its blood supply.  The edges of the donor site were undermined.   The donor site was closed in a primary fashion.  The pedicle was then rotated into position and sutured.  Once the tube was sutured into place, adequate blood supply was confirmed with blanching and refill.  The pedicle was then wrapped with xeroform gauze and dressed appropriately with a telfa and gauze bandage to ensure continued blood supply and protect the attached pedicle.
Interpolation Flap Text: A decision was made to reconstruct the defect utilizing an interpolation axial flap and a staged reconstruction.  A telfa template was made of the defect.  This telfa template was then used to outline the interpolation flap.  The donor area for the pedicle flap was then injected with anesthesia.  The flap was excised through the skin and subcutaneous tissue down to the layer of the underlying musculature.  The interpolation flap was carefully excised within this deep plane to maintain its blood supply.  The edges of the donor site were undermined.   The donor site was closed in a primary fashion.  The pedicle was then rotated into position and sutured.  Once the tube was sutured into place, adequate blood supply was confirmed with blanching and refill.  The pedicle was then wrapped with xeroform gauze and dressed appropriately with a telfa and gauze bandage to ensure continued blood supply and protect the attached pedicle.
Melolabial Interpolation Flap Text: A decision was made to reconstruct the defect utilizing an interpolation axial flap and a staged reconstruction.  A telfa template was made of the defect.  This telfa template was then used to outline the melolabial interpolation flap.  The donor area for the pedicle flap was then injected with anesthesia.  The flap was excised through the skin and subcutaneous tissue down to the layer of the underlying musculature.  The pedicle flap was carefully excised within this deep plane to maintain its blood supply.  The edges of the donor site were undermined.   The donor site was closed in a primary fashion.  The pedicle was then rotated into position and sutured.  Once the tube was sutured into place, adequate blood supply was confirmed with blanching and refill.  The pedicle was then wrapped with xeroform gauze and dressed appropriately with a telfa and gauze bandage to ensure continued blood supply and protect the attached pedicle.
Mastoid Interpolation Flap Text: A decision was made to reconstruct the defect utilizing an interpolation axial flap and a staged reconstruction.  A telfa template was made of the defect.  This telfa template was then used to outline the mastoid interpolation flap.  The donor area for the pedicle flap was then injected with anesthesia.  The flap was excised through the skin and subcutaneous tissue down to the layer of the underlying musculature.  The pedicle flap was carefully excised within this deep plane to maintain its blood supply.  The edges of the donor site were undermined.   The donor site was closed in a primary fashion.  The pedicle was then rotated into position and sutured.  Once the tube was sutured into place, adequate blood supply was confirmed with blanching and refill.  The pedicle was then wrapped with xeroform gauze and dressed appropriately with a telfa and gauze bandage to ensure continued blood supply and protect the attached pedicle.
Posterior Auricular Interpolation Flap Text: A decision was made to reconstruct the defect utilizing an interpolation axial flap and a staged reconstruction.  A telfa template was made of the defect.  This telfa template was then used to outline the posterior auricular interpolation flap.  The donor area for the pedicle flap was then injected with anesthesia.  The flap was excised through the skin and subcutaneous tissue down to the layer of the underlying musculature.  The pedicle flap was carefully excised within this deep plane to maintain its blood supply.  The edges of the donor site were undermined.   The donor site was closed in a primary fashion.  The pedicle was then rotated into position and sutured.  Once the tube was sutured into place, adequate blood supply was confirmed with blanching and refill.  The pedicle was then wrapped with xeroform gauze and dressed appropriately with a telfa and gauze bandage to ensure continued blood supply and protect the attached pedicle.
Paramedian Forehead Flap Text: A decision was made to reconstruct the defect utilizing an interpolation axial flap and a staged reconstruction.  A telfa template was made of the defect.  This telfa template was then used to outline the paramedian forehead pedicle flap.  The donor area for the pedicle flap was then injected with anesthesia.  The flap was excised through the skin and subcutaneous tissue down to the layer of the underlying musculature.  The pedicle flap was carefully excised within this deep plane to maintain its blood supply.  The edges of the donor site were undermined.   The donor site was closed in a primary fashion.  The pedicle was then rotated into position and sutured.  Once the tube was sutured into place, adequate blood supply was confirmed with blanching and refill.  The pedicle was then wrapped with xeroform gauze and dressed appropriately with a telfa and gauze bandage to ensure continued blood supply and protect the attached pedicle.
Lip Wedge Excision Repair Text: Given the location of the defect and the proximity to free margins a full thickness wedge repair was deemed most appropriate.  Using a sterile surgical marker, the appropriate repair was drawn incorporating the defect and placing the expected incisions perpendicular to the vermilion border.  The vermilion border was also meticulously outlined to ensure appropriate reapproximation during the repair.  The area thus outlined was incised through and through with a #15 scalpel blade.  The muscularis and dermis were reaproximated with deep sutures following hemostasis. Care was taken to realign the vermilion border before proceeding with the superficial closure.  Once the vermilion was realigned the superfical and mucosal closure was finished.
Ftsg Text: The defect edges were debeveled with a #15 scalpel blade.  Given the location of the defect, shape of the defect and the proximity to free margins a full thickness skin graft was deemed most appropriate.  Using a sterile surgical marker, the primary defect shape was transferred to the donor site. The area thus outlined was incised deep to adipose tissue with a #15 scalpel blade.  The harvested graft was then trimmed of adipose tissue until only dermis and epidermis was left.  The skin margins of the secondary defect were undermined to an appropriate distance in all directions utilizing iris scissors.  The secondary defect was closed with interrupted buried subcutaneous sutures.  The skin edges were then re-apposed with running  sutures.  The skin graft was then placed in the primary defect and oriented appropriately.
Split-Thickness Skin Graft Text: The defect edges were debeveled with a #15 scalpel blade.  Given the location of the defect, shape of the defect and the proximity to free margins a split thickness skin graft was deemed most appropriate.  Using a sterile surgical marker, the primary defect shape was transferred to the donor site. The split thickness graft was then harvested.  The skin graft was then placed in the primary defect and oriented appropriately.
Cartilage Graft Text: The defect edges were debeveled with a #15 scalpel blade.  Given the location of the defect, shape of the defect, the fact the defect involved a full thickness cartilage defect a cartilage graft was deemed most appropriate.  An appropriate donor site was identified, cleansed, and anesthetized. The cartilage graft was then harvested and transferred to the recipient site, oriented appropriately and then sutured into place.  The secondary defect was then repaired using a primary closure.
Composite Graft Text: The defect edges were debeveled with a #15 scalpel blade.  Given the location of the defect, shape of the defect, the proximity to free margins and the fact the defect was full thickness a composite graft was deemed most appropriate.  The defect was outline and then transferred to the donor site.  A full thickness graft was then excised from the donor site. The graft was then placed in the primary defect, oriented appropriately and then sutured into place.  The secondary defect was then repaired using a primary closure.
Epidermal Autograft Text: The defect edges were debeveled with a #15 scalpel blade.  Given the location of the defect, shape of the defect and the proximity to free margins an epidermal autograft was deemed most appropriate.  Using a sterile surgical marker, the primary defect shape was transferred to the donor site. The epidermal graft was then harvested.  The skin graft was then placed in the primary defect and oriented appropriately.
Dermal Autograft Text: The defect edges were debeveled with a #15 scalpel blade.  Given the location of the defect, shape of the defect and the proximity to free margins a dermal autograft was deemed most appropriate.  Using a sterile surgical marker, the primary defect shape was transferred to the donor site. The area thus outlined was incised deep to adipose tissue with a #15 scalpel blade.  The harvested graft was then trimmed of adipose and epidermal tissue until only dermis was left.  The skin graft was then placed in the primary defect and oriented appropriately.
Skin Substitute Text: The defect edges were debeveled with a #15 scalpel blade.  Given the location of the defect, shape of the defect and the proximity to free margins a skin substitute graft was deemed most appropriate.  The graft material was trimmed to fit the size of the defect. The graft was then placed in the primary defect and oriented appropriately.
Tissue Cultured Epidermal Autograft Text: The defect edges were debeveled with a #15 scalpel blade.  Given the location of the defect, shape of the defect and the proximity to free margins a tissue cultured epidermal autograft was deemed most appropriate.  The graft was then trimmed to fit the size of the defect.  The graft was then placed in the primary defect and oriented appropriately.
Xenograft Text: The defect edges were debeveled with a #15 scalpel blade.  Given the location of the defect, shape of the defect and the proximity to free margins a xenograft was deemed most appropriate.  The graft was then trimmed to fit the size of the defect.  The graft was then placed in the primary defect and oriented appropriately.
Purse String (Intermediate) Text: Given the location of the defect and the characteristics of the surrounding skin a purse string intermediate closure was deemed most appropriate.  Undermining was performed circumferentially around the surgical defect.  A purse string suture was then placed and tightened.
Purse String (Simple) Text: Given the location of the defect and the characteristics of the surrounding skin a purse string simple closure was deemed most appropriate.  Undermining was performed circumferentially around the surgical defect.  A purse string suture was then placed and tightened.
Complex Repair And Single Advancement Flap Text: The defect edges were debeveled with a #15 scalpel blade.  The primary defect was closed partially with a complex linear closure.  Given the location of the remaining defect, shape of the defect and the proximity to free margins a single advancement flap was deemed most appropriate for complete closure of the defect.  Using a sterile surgical marker, an appropriate advancement flap was drawn incorporating the defect and placing the expected incisions within the relaxed skin tension lines where possible.    The area thus outlined was incised deep to adipose tissue with a #15 scalpel blade.  The skin margins were undermined to an appropriate distance in all directions utilizing iris scissors.
Complex Repair And Double Advancement Flap Text: The defect edges were debeveled with a #15 scalpel blade.  The primary defect was closed partially with a complex linear closure.  Given the location of the remaining defect, shape of the defect and the proximity to free margins a double advancement flap was deemed most appropriate for complete closure of the defect.  Using a sterile surgical marker, an appropriate advancement flap was drawn incorporating the defect and placing the expected incisions within the relaxed skin tension lines where possible.    The area thus outlined was incised deep to adipose tissue with a #15 scalpel blade.  The skin margins were undermined to an appropriate distance in all directions utilizing iris scissors.
Complex Repair And Modified Advancement Flap Text: The defect edges were debeveled with a #15 scalpel blade.  The primary defect was closed partially with a complex linear closure.  Given the location of the remaining defect, shape of the defect and the proximity to free margins a modified advancement flap was deemed most appropriate for complete closure of the defect.  Using a sterile surgical marker, an appropriate advancement flap was drawn incorporating the defect and placing the expected incisions within the relaxed skin tension lines where possible.    The area thus outlined was incised deep to adipose tissue with a #15 scalpel blade.  The skin margins were undermined to an appropriate distance in all directions utilizing iris scissors.
Complex Repair And A-T Advancement Flap Text: The defect edges were debeveled with a #15 scalpel blade.  The primary defect was closed partially with a complex linear closure.  Given the location of the remaining defect, shape of the defect and the proximity to free margins an A-T advancement flap was deemed most appropriate for complete closure of the defect.  Using a sterile surgical marker, an appropriate advancement flap was drawn incorporating the defect and placing the expected incisions within the relaxed skin tension lines where possible.    The area thus outlined was incised deep to adipose tissue with a #15 scalpel blade.  The skin margins were undermined to an appropriate distance in all directions utilizing iris scissors.
Complex Repair And O-T Advancement Flap Text: The defect edges were debeveled with a #15 scalpel blade.  The primary defect was closed partially with a complex linear closure.  Given the location of the remaining defect, shape of the defect and the proximity to free margins an O-T advancement flap was deemed most appropriate for complete closure of the defect.  Using a sterile surgical marker, an appropriate advancement flap was drawn incorporating the defect and placing the expected incisions within the relaxed skin tension lines where possible.    The area thus outlined was incised deep to adipose tissue with a #15 scalpel blade.  The skin margins were undermined to an appropriate distance in all directions utilizing iris scissors.
Complex Repair And O-L Flap Text: The defect edges were debeveled with a #15 scalpel blade.  The primary defect was closed partially with a complex linear closure.  Given the location of the remaining defect, shape of the defect and the proximity to free margins an O-L flap was deemed most appropriate for complete closure of the defect.  Using a sterile surgical marker, an appropriate flap was drawn incorporating the defect and placing the expected incisions within the relaxed skin tension lines where possible.    The area thus outlined was incised deep to adipose tissue with a #15 scalpel blade.  The skin margins were undermined to an appropriate distance in all directions utilizing iris scissors.
Complex Repair And Bilobe Flap Text: The defect edges were debeveled with a #15 scalpel blade.  The primary defect was closed partially with a complex linear closure.  Given the location of the remaining defect, shape of the defect and the proximity to free margins a bilobe flap was deemed most appropriate for complete closure of the defect.  Using a sterile surgical marker, an appropriate advancement flap was drawn incorporating the defect and placing the expected incisions within the relaxed skin tension lines where possible.    The area thus outlined was incised deep to adipose tissue with a #15 scalpel blade.  The skin margins were undermined to an appropriate distance in all directions utilizing iris scissors.
Complex Repair And Melolabial Flap Text: The defect edges were debeveled with a #15 scalpel blade.  The primary defect was closed partially with a complex linear closure.  Given the location of the remaining defect, shape of the defect and the proximity to free margins a melolabial flap was deemed most appropriate for complete closure of the defect.  Using a sterile surgical marker, an appropriate advancement flap was drawn incorporating the defect and placing the expected incisions within the relaxed skin tension lines where possible.    The area thus outlined was incised deep to adipose tissue with a #15 scalpel blade.  The skin margins were undermined to an appropriate distance in all directions utilizing iris scissors.
Complex Repair And Rotation Flap Text: The defect edges were debeveled with a #15 scalpel blade.  The primary defect was closed partially with a complex linear closure.  Given the location of the remaining defect, shape of the defect and the proximity to free margins a rotation flap was deemed most appropriate for complete closure of the defect.  Using a sterile surgical marker, an appropriate advancement flap was drawn incorporating the defect and placing the expected incisions within the relaxed skin tension lines where possible.    The area thus outlined was incised deep to adipose tissue with a #15 scalpel blade.  The skin margins were undermined to an appropriate distance in all directions utilizing iris scissors.
Complex Repair And Rhombic Flap Text: The defect edges were debeveled with a #15 scalpel blade.  The primary defect was closed partially with a complex linear closure.  Given the location of the remaining defect, shape of the defect and the proximity to free margins a rhombic flap was deemed most appropriate for complete closure of the defect.  Using a sterile surgical marker, an appropriate advancement flap was drawn incorporating the defect and placing the expected incisions within the relaxed skin tension lines where possible.    The area thus outlined was incised deep to adipose tissue with a #15 scalpel blade.  The skin margins were undermined to an appropriate distance in all directions utilizing iris scissors.
Complex Repair And Transposition Flap Text: The defect edges were debeveled with a #15 scalpel blade.  The primary defect was closed partially with a complex linear closure.  Given the location of the remaining defect, shape of the defect and the proximity to free margins a transposition flap was deemed most appropriate for complete closure of the defect.  Using a sterile surgical marker, an appropriate advancement flap was drawn incorporating the defect and placing the expected incisions within the relaxed skin tension lines where possible.    The area thus outlined was incised deep to adipose tissue with a #15 scalpel blade.  The skin margins were undermined to an appropriate distance in all directions utilizing iris scissors.
Complex Repair And V-Y Plasty Text: The defect edges were debeveled with a #15 scalpel blade.  The primary defect was closed partially with a complex linear closure.  Given the location of the remaining defect, shape of the defect and the proximity to free margins a V-Y plasty was deemed most appropriate for complete closure of the defect.  Using a sterile surgical marker, an appropriate advancement flap was drawn incorporating the defect and placing the expected incisions within the relaxed skin tension lines where possible.    The area thus outlined was incised deep to adipose tissue with a #15 scalpel blade.  The skin margins were undermined to an appropriate distance in all directions utilizing iris scissors.
Complex Repair And M Plasty Text: The defect edges were debeveled with a #15 scalpel blade.  The primary defect was closed partially with a complex linear closure.  Given the location of the remaining defect, shape of the defect and the proximity to free margins an M plasty was deemed most appropriate for complete closure of the defect.  Using a sterile surgical marker, an appropriate advancement flap was drawn incorporating the defect and placing the expected incisions within the relaxed skin tension lines where possible.    The area thus outlined was incised deep to adipose tissue with a #15 scalpel blade.  The skin margins were undermined to an appropriate distance in all directions utilizing iris scissors.
Complex Repair And Double M Plasty Text: The defect edges were debeveled with a #15 scalpel blade.  The primary defect was closed partially with a complex linear closure.  Given the location of the remaining defect, shape of the defect and the proximity to free margins a double M plasty was deemed most appropriate for complete closure of the defect.  Using a sterile surgical marker, an appropriate advancement flap was drawn incorporating the defect and placing the expected incisions within the relaxed skin tension lines where possible.    The area thus outlined was incised deep to adipose tissue with a #15 scalpel blade.  The skin margins were undermined to an appropriate distance in all directions utilizing iris scissors.
Complex Repair And W Plasty Text: The defect edges were debeveled with a #15 scalpel blade.  The primary defect was closed partially with a complex linear closure.  Given the location of the remaining defect, shape of the defect and the proximity to free margins a W plasty was deemed most appropriate for complete closure of the defect.  Using a sterile surgical marker, an appropriate advancement flap was drawn incorporating the defect and placing the expected incisions within the relaxed skin tension lines where possible.    The area thus outlined was incised deep to adipose tissue with a #15 scalpel blade.  The skin margins were undermined to an appropriate distance in all directions utilizing iris scissors.
Complex Repair And Z Plasty Text: The defect edges were debeveled with a #15 scalpel blade.  The primary defect was closed partially with a complex linear closure.  Given the location of the remaining defect, shape of the defect and the proximity to free margins a Z plasty was deemed most appropriate for complete closure of the defect.  Using a sterile surgical marker, an appropriate advancement flap was drawn incorporating the defect and placing the expected incisions within the relaxed skin tension lines where possible.    The area thus outlined was incised deep to adipose tissue with a #15 scalpel blade.  The skin margins were undermined to an appropriate distance in all directions utilizing iris scissors.
Complex Repair And Dorsal Nasal Flap Text: The defect edges were debeveled with a #15 scalpel blade.  The primary defect was closed partially with a complex linear closure.  Given the location of the remaining defect, shape of the defect and the proximity to free margins a dorsal nasal flap was deemed most appropriate for complete closure of the defect.  Using a sterile surgical marker, an appropriate flap was drawn incorporating the defect and placing the expected incisions within the relaxed skin tension lines where possible.    The area thus outlined was incised deep to adipose tissue with a #15 scalpel blade.  The skin margins were undermined to an appropriate distance in all directions utilizing iris scissors.
Complex Repair And Ftsg Text: The defect edges were debeveled with a #15 scalpel blade.  The primary defect was closed partially with a complex linear closure.  Given the location of the defect, shape of the defect and the proximity to free margins a full thickness skin graft was deemed most appropriate to repair the remaining defect.  The graft was trimmed to fit the size of the remaining defect.  The graft was then placed in the primary defect, oriented appropriately, and sutured into place.
Complex Repair And Split-Thickness Skin Graft Text: The defect edges were debeveled with a #15 scalpel blade.  The primary defect was closed partially with a complex linear closure.  Given the location of the defect, shape of the defect and the proximity to free margins a split thickness skin graft was deemed most appropriate to repair the remaining defect.  The graft was trimmed to fit the size of the remaining defect.  The graft was then placed in the primary defect, oriented appropriately, and sutured into place.
Complex Repair And Epidermal Autograft Text: The defect edges were debeveled with a #15 scalpel blade.  The primary defect was closed partially with a complex linear closure.  Given the location of the defect, shape of the defect and the proximity to free margins an epidermal autograft was deemed most appropriate to repair the remaining defect.  The graft was trimmed to fit the size of the remaining defect.  The graft was then placed in the primary defect, oriented appropriately, and sutured into place.
Complex Repair And Dermal Autograft Text: The defect edges were debeveled with a #15 scalpel blade.  The primary defect was closed partially with a complex linear closure.  Given the location of the defect, shape of the defect and the proximity to free margins an dermal autograft was deemed most appropriate to repair the remaining defect.  The graft was trimmed to fit the size of the remaining defect.  The graft was then placed in the primary defect, oriented appropriately, and sutured into place.
Complex Repair And Tissue Cultured Epidermal Autograft Text: The defect edges were debeveled with a #15 scalpel blade.  The primary defect was closed partially with a complex linear closure.  Given the location of the defect, shape of the defect and the proximity to free margins an tissue cultured epidermal autograft was deemed most appropriate to repair the remaining defect.  The graft was trimmed to fit the size of the remaining defect.  The graft was then placed in the primary defect, oriented appropriately, and sutured into place.
Complex Repair And Xenograft Text: The defect edges were debeveled with a #15 scalpel blade.  The primary defect was closed partially with a complex linear closure.  Given the location of the defect, shape of the defect and the proximity to free margins a xenograft was deemed most appropriate to repair the remaining defect.  The graft was trimmed to fit the size of the remaining defect.  The graft was then placed in the primary defect, oriented appropriately, and sutured into place.
Complex Repair And Skin Substitute Graft Text: The defect edges were debeveled with a #15 scalpel blade.  The primary defect was closed partially with a complex linear closure.  Given the location of the remaining defect, shape of the defect and the proximity to free margins a skin substitute graft was deemed most appropriate to repair the remaining defect.  The graft was trimmed to fit the size of the remaining defect.  The graft was then placed in the primary defect, oriented appropriately, and sutured into place.
Path Notes (To The Dermatopathologist): Please check margins.
Consent was obtained from the patient. The risks and benefits to therapy were discussed in detail. Specifically, the risks of infection, scarring, bleeding, prolonged wound healing, incomplete removal, allergy to anesthesia, nerve injury and recurrence were addressed. Prior to the procedure, the treatment site was clearly identified and confirmed by the patient. All components of Universal Protocol/PAUSE Rule completed.
Post-Care Instructions: I reviewed with the patient in detail post-care instructions:\\n1. Apply bacitracin over the steri-strips.  \\n2. Cut non-stick pad (Telfa) to cover the steri-strips\\n3. Apply tape (hypafix) over the non-stick pad\\n4. Change once per day for 5 days\\n5. Shower with bandage on, change bandage after shower\\n\\nPatient is not to engage in any heavy lifting, exercise, hot tub, or swimming for the next 14 days. Should the patient develop any fevers, chills, bleeding, severe pain patient will contact the office immediately.
Home Suture Removal Text: Patient was provided a home suture removal kit and will remove their sutures at home.  If they have any questions or difficulties they will call the office.
Where Do You Want The Question To Include Opioid Counseling Located?: Case Summary Tab
Billing Type: Third-Party Bill
Information: Selecting Yes will display possible errors in your note based on the variables you have selected. This validation is only offered as a suggestion for you. PLEASE NOTE THAT THE VALIDATION TEXT WILL BE REMOVED WHEN YOU FINALIZE YOUR NOTE. IF YOU WANT TO FAX A PRELIMINARY NOTE YOU WILL NEED TO TOGGLE THIS TO 'NO' IF YOU DO NOT WANT IT IN YOUR FAXED NOTE.

## 2020-10-02 ENCOUNTER — OFFICE VISIT (OUTPATIENT)
Dept: URGENT CARE | Facility: PHYSICIAN GROUP | Age: 45
End: 2020-10-02

## 2020-10-02 VITALS
SYSTOLIC BLOOD PRESSURE: 122 MMHG | WEIGHT: 247 LBS | RESPIRATION RATE: 16 BRPM | HEART RATE: 72 BPM | OXYGEN SATURATION: 95 % | DIASTOLIC BLOOD PRESSURE: 66 MMHG | HEIGHT: 75 IN | TEMPERATURE: 98.2 F | BODY MASS INDEX: 30.71 KG/M2

## 2020-10-02 DIAGNOSIS — Z02.89 ENCOUNTER FOR EXAMINATION REQUIRED BY DEPARTMENT OF TRANSPORTATION (DOT): ICD-10-CM

## 2020-10-02 PROCEDURE — 7100 PR DOT PHYSICAL: Performed by: PHYSICIAN ASSISTANT

## 2020-10-02 RX ORDER — LEVOTHYROXINE SODIUM 0.12 MG/1
150 TABLET ORAL
COMMUNITY
End: 2023-02-07

## 2020-10-02 NOTE — PROGRESS NOTES
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 No major medical history, no chronic conditions, no chronic medications. No history of asthma, heart disease, murmurs, seizure disorder or syncopal episodes with activity.  Please see the chart for further information, however normal physical exam, cleared for 2 year certificate without restrictions.

## 2020-11-16 ENCOUNTER — NURSE TRIAGE (OUTPATIENT)
Dept: HEALTH INFORMATION MANAGEMENT | Facility: OTHER | Age: 45
End: 2020-11-16

## 2021-06-01 ENCOUNTER — HOSPITAL ENCOUNTER (OUTPATIENT)
Dept: LAB | Facility: MEDICAL CENTER | Age: 46
End: 2021-06-01
Attending: UROLOGY
Payer: COMMERCIAL

## 2021-06-01 PROCEDURE — 84403 ASSAY OF TOTAL TESTOSTERONE: CPT

## 2021-06-01 PROCEDURE — 84270 ASSAY OF SEX HORMONE GLOBUL: CPT | Mod: 91

## 2021-06-01 PROCEDURE — 84436 ASSAY OF TOTAL THYROXINE: CPT

## 2021-06-01 PROCEDURE — 84153 ASSAY OF PSA TOTAL: CPT

## 2021-06-01 PROCEDURE — 84450 TRANSFERASE (AST) (SGOT): CPT

## 2021-06-01 PROCEDURE — 85018 HEMOGLOBIN: CPT

## 2021-06-01 PROCEDURE — 84443 ASSAY THYROID STIM HORMONE: CPT

## 2021-06-01 PROCEDURE — 84460 ALANINE AMINO (ALT) (SGPT): CPT

## 2021-06-01 PROCEDURE — 84075 ASSAY ALKALINE PHOSPHATASE: CPT

## 2021-06-01 PROCEDURE — 83001 ASSAY OF GONADOTROPIN (FSH): CPT

## 2021-06-01 PROCEDURE — 82670 ASSAY OF TOTAL ESTRADIOL: CPT

## 2021-06-01 PROCEDURE — 84480 ASSAY TRIIODOTHYRONINE (T3): CPT

## 2021-06-01 PROCEDURE — 36415 COLL VENOUS BLD VENIPUNCTURE: CPT

## 2021-06-01 PROCEDURE — 84402 ASSAY OF FREE TESTOSTERONE: CPT

## 2021-06-01 PROCEDURE — 82642 DIHYDROTESTOSTERONE: CPT

## 2021-06-01 PROCEDURE — 84146 ASSAY OF PROLACTIN: CPT

## 2021-06-01 PROCEDURE — 84305 ASSAY OF SOMATOMEDIN: CPT

## 2021-06-01 PROCEDURE — 85014 HEMATOCRIT: CPT

## 2021-06-01 PROCEDURE — 83002 ASSAY OF GONADOTROPIN (LH): CPT

## 2021-06-02 LAB
ALP SERPL-CCNC: 68 U/L (ref 30–99)
ALT SERPL-CCNC: 55 U/L (ref 2–50)
AST SERPL-CCNC: 41 U/L (ref 12–45)
FSH SERPL-ACNC: 4.6 MIU/ML (ref 1.5–12.4)
HCT VFR BLD AUTO: 46.5 % (ref 42–52)
HGB BLD-MCNC: 15.7 G/DL (ref 14–18)
LH SERPL-ACNC: 3.1 IU/L (ref 1.7–8.6)
PROLACTIN SERPL-MCNC: 12.5 NG/ML (ref 2.1–17.7)
PSA SERPL-MCNC: 0.5 NG/ML (ref 0–4)
T3 SERPL-MCNC: 91.9 NG/DL (ref 60–181)
T4 SERPL-MCNC: 6.5 UG/DL (ref 4–12)
TSH SERPL DL<=0.005 MIU/L-ACNC: 3.28 UIU/ML (ref 0.38–5.33)

## 2021-06-03 LAB
IGF-I SERPL-MCNC: 156 NG/ML (ref 78–233)
IGF-I Z-SCORE SERPL: 0.3
SHBG SERPL-SCNC: 57 NMOL/L (ref 11–80)
SHBG SERPL-SCNC: 57 NMOL/L (ref 11–80)
TESTOST FREE MFR SERPL: 1.3 % (ref 1.6–2.9)
TESTOST FREE SERPL-MCNC: 49 PG/ML (ref 47–244)
TESTOST SERPL-MCNC: 374 NG/DL (ref 300–890)

## 2021-06-04 LAB — ANDROSTANOLONE SERPL-MCNC: 313 PG/ML (ref 106–719)

## 2021-06-05 LAB — ESTRADIOL SERPL HS-MCNC: 9.3 PG/ML (ref 10–42)

## 2021-07-13 ENCOUNTER — OFFICE VISIT (OUTPATIENT)
Dept: URGENT CARE | Facility: PHYSICIAN GROUP | Age: 46
End: 2021-07-13
Payer: COMMERCIAL

## 2021-07-13 VITALS
WEIGHT: 246 LBS | HEART RATE: 76 BPM | HEIGHT: 75 IN | SYSTOLIC BLOOD PRESSURE: 120 MMHG | OXYGEN SATURATION: 97 % | DIASTOLIC BLOOD PRESSURE: 72 MMHG | BODY MASS INDEX: 30.59 KG/M2 | TEMPERATURE: 98.2 F | RESPIRATION RATE: 16 BRPM

## 2021-07-13 DIAGNOSIS — B02.9 HERPES ZOSTER WITHOUT COMPLICATION: ICD-10-CM

## 2021-07-13 PROCEDURE — 99214 OFFICE O/P EST MOD 30 MIN: CPT | Performed by: PHYSICIAN ASSISTANT

## 2021-07-13 RX ORDER — VALACYCLOVIR HYDROCHLORIDE 1 G/1
1000 TABLET, FILM COATED ORAL 3 TIMES DAILY
Qty: 21 TABLET | Refills: 0 | Status: SHIPPED | OUTPATIENT
Start: 2021-07-13 | End: 2021-07-20

## 2021-07-13 NOTE — PROGRESS NOTES
Chief Complaint   Patient presents with   • Rash     x 3 days thinks it might be shingles       HISTORY OF PRESENT ILLNESS: Patient is a 45 y.o. male who presents today because he has a 2 to 3-day history of painful rash on his mid thoracic area posteriorly as well as midthoracic anteriorly.  He has not been putting anything on it or taking anything for it other than Benadryl.    Patient Active Problem List    Diagnosis Date Noted   • Cervical spine pain 10/11/2017   • Cervical radiculopathy 09/19/2017       Allergies:Wellbutrin [bupropion]    Current Outpatient Medications Ordered in Epic   Medication Sig Dispense Refill   • valacyclovir (VALTREX) 1 GM Tab Take 1 tablet by mouth 3 times a day for 7 days. 21 tablet 0   • levothyroxine (SYNTHROID) 125 MCG Tab Take 125 mcg by mouth Every morning on an empty stomach.     • amitriptyline (ELAVIL) 25 MG Tab Take 25 mg by mouth. (Patient not taking: Reported on 7/13/2021)  1   • amitriptyline (ELAVIL) 10 MG Tab Take 10 mg by mouth. (Patient not taking: Reported on 7/13/2021)  0   • gabapentin (NEURONTIN) 400 MG Cap Take 400 mg by mouth. (Patient not taking: Reported on 7/13/2021)  0   • gabapentin (NEURONTIN) 300 MG Cap Take 300 mg by mouth. (Patient not taking: Reported on 7/13/2021)  0   • ibuprofen (MOTRIN) 800 MG Tab Take 800 mg by mouth. (Patient not taking: Reported on 7/13/2021)  0   • lidocaine (LIDODERM) 5 % Patch 1 Patch every day. LEAVE ON FOR 12 HOURS AND THEN OFF FOR 12 HOURS. (Patient not taking: Reported on 7/13/2021)  0     No current [facility]-ordered facility-administered medications on file.       Past Medical History:   Diagnosis Date   • Deviated septum 2014       Social History     Tobacco Use   • Smoking status: Never Smoker   • Smokeless tobacco: Never Used   Substance Use Topics   • Alcohol use: Yes     Comment: rare   • Drug use: Not on file       Family Status   Relation Name Status   • Mo  Alive   • Fa  Alive   • Sis  Alive   • Bro  Alive   • Sis   "Alive     Family History   Problem Relation Age of Onset   • Depression Mother    • Hypertension Mother    • Alcohol/Drug Father    • Hyperlipidemia Father    • Hypertension Father    • Alcohol/Drug Brother    • Blood Clots Brother        ROS:  Review of Systems   Constitutional: Negative for fever, chills, weight loss and malaise/fatigue.   HENT: Negative for ear pain, nosebleeds, congestion, sore throat and neck pain.    Eyes: Negative for blurred vision.   Respiratory: Negative for cough, sputum production, shortness of breath and wheezing.    Cardiovascular: Negative for chest pain, palpitations, orthopnea and leg swelling.   Gastrointestinal: Negative for heartburn, nausea, vomiting and abdominal pain.   Genitourinary: Negative for dysuria, urgency and frequency.     Exam:  /72   Pulse 76   Temp 36.8 °C (98.2 °F) (Temporal)   Resp 16   Ht 1.905 m (6' 3\")   Wt 112 kg (246 lb)   SpO2 97%   General:  Well nourished, well developed male in NAD  Head:Normocephalic, atraumatic  Eyes: PERRLA, EOM within normal limits, no conjunctival injection, no scleral icterus, visual fields and acuity grossly intact.  Nose: Symmetrical without tenderness, no discharge.  Mouth: reasonable hygiene, no erythema exudates or tonsillar enlargement.  Neck: no masses, range of motion within normal limits, no tracheal deviation. No obvious thyroid enlargement.  Extremities: no clubbing, cyanosis, or edema.  Skin: He has 2 rashes on his thorax.  1 in the posterior mid thoracic region and one anteriorly along the same dermatome pattern.  There are approximately 8 to 10 cm in diameter and they consist of erythematous maculopapular lesions    Please note that this dictation was created using voice recognition software. I have made every reasonable attempt to correct obvious errors, but I expect that there are errors of grammar and possibly content that I did not discover before finalizing the note.    Assessment/Plan:  1. Herpes " zoster without complication  valacyclovir (VALTREX) 1 GM Tab   Over-the-counter Caladryl    Followup with primary care in the next 7-10 days if not significantly improving, return to the urgent care or go to the emergency room sooner for any worsening of symptoms.

## 2021-09-11 ENCOUNTER — HOSPITAL ENCOUNTER (OUTPATIENT)
Dept: LAB | Facility: MEDICAL CENTER | Age: 46
End: 2021-09-11
Attending: UROLOGY
Payer: COMMERCIAL

## 2021-09-11 LAB
ALP SERPL-CCNC: 65 U/L (ref 30–99)
ALT SERPL-CCNC: 54 U/L (ref 2–50)
AST SERPL-CCNC: 49 U/L (ref 12–45)
ESTRADIOL SERPL-MCNC: 30.4 PG/ML
HCT VFR BLD AUTO: 52.5 % (ref 42–52)
HGB BLD-MCNC: 17.3 G/DL (ref 14–18)
PSA SERPL-MCNC: 0.88 NG/ML (ref 0–4)

## 2021-09-11 PROCEDURE — 84450 TRANSFERASE (AST) (SGOT): CPT

## 2021-09-11 PROCEDURE — 82670 ASSAY OF TOTAL ESTRADIOL: CPT

## 2021-09-11 PROCEDURE — 84460 ALANINE AMINO (ALT) (SGPT): CPT

## 2021-09-11 PROCEDURE — 84403 ASSAY OF TOTAL TESTOSTERONE: CPT

## 2021-09-11 PROCEDURE — 85014 HEMATOCRIT: CPT

## 2021-09-11 PROCEDURE — 36415 COLL VENOUS BLD VENIPUNCTURE: CPT

## 2021-09-11 PROCEDURE — 84075 ASSAY ALKALINE PHOSPHATASE: CPT

## 2021-09-11 PROCEDURE — 84402 ASSAY OF FREE TESTOSTERONE: CPT

## 2021-09-11 PROCEDURE — 84270 ASSAY OF SEX HORMONE GLOBUL: CPT

## 2021-09-11 PROCEDURE — 85018 HEMOGLOBIN: CPT

## 2021-09-11 PROCEDURE — 84153 ASSAY OF PSA TOTAL: CPT

## 2021-09-14 LAB
SHBG SERPL-SCNC: 35 NMOL/L (ref 11–80)
TESTOST FREE MFR SERPL: ABNORMAL % (ref 1.6–2.9)
TESTOST FREE SERPL-MCNC: ABNORMAL PG/ML (ref 47–244)
TESTOST SERPL-MCNC: >1500 NG/DL (ref 300–890)

## 2021-10-05 ENCOUNTER — HOSPITAL ENCOUNTER (OUTPATIENT)
Dept: LAB | Facility: MEDICAL CENTER | Age: 46
End: 2021-10-05
Attending: UROLOGY
Payer: COMMERCIAL

## 2021-10-05 LAB
HCT VFR BLD AUTO: 48.2 % (ref 42–52)
HGB BLD-MCNC: 16.3 G/DL (ref 14–18)

## 2021-10-05 PROCEDURE — 85018 HEMOGLOBIN: CPT

## 2021-10-05 PROCEDURE — 85014 HEMATOCRIT: CPT

## 2021-10-05 PROCEDURE — 84153 ASSAY OF PSA TOTAL: CPT

## 2021-10-05 PROCEDURE — 84403 ASSAY OF TOTAL TESTOSTERONE: CPT

## 2021-10-05 PROCEDURE — 82670 ASSAY OF TOTAL ESTRADIOL: CPT

## 2021-10-05 PROCEDURE — 84270 ASSAY OF SEX HORMONE GLOBUL: CPT

## 2021-10-05 PROCEDURE — 84402 ASSAY OF FREE TESTOSTERONE: CPT

## 2021-10-05 PROCEDURE — 84450 TRANSFERASE (AST) (SGOT): CPT

## 2021-10-05 PROCEDURE — 36415 COLL VENOUS BLD VENIPUNCTURE: CPT

## 2021-10-05 PROCEDURE — 84460 ALANINE AMINO (ALT) (SGPT): CPT

## 2021-10-05 PROCEDURE — 84075 ASSAY ALKALINE PHOSPHATASE: CPT

## 2021-10-06 LAB
ALP SERPL-CCNC: 71 U/L (ref 30–99)
ALT SERPL-CCNC: 58 U/L (ref 2–50)
AST SERPL-CCNC: 62 U/L (ref 12–45)
ESTRADIOL SERPL-MCNC: 48.6 PG/ML
PSA SERPL-MCNC: 0.98 NG/ML (ref 0–4)

## 2021-10-07 LAB
SHBG SERPL-SCNC: 31 NMOL/L (ref 11–80)
TESTOST FREE MFR SERPL: ABNORMAL % (ref 1.6–2.9)
TESTOST FREE SERPL-MCNC: ABNORMAL PG/ML (ref 47–244)
TESTOST SERPL-MCNC: >1500 NG/DL (ref 300–890)

## 2021-11-19 ENCOUNTER — HOSPITAL ENCOUNTER (OUTPATIENT)
Dept: LAB | Facility: MEDICAL CENTER | Age: 46
End: 2021-11-19
Attending: UROLOGY
Payer: COMMERCIAL

## 2021-11-19 PROCEDURE — 84270 ASSAY OF SEX HORMONE GLOBUL: CPT

## 2021-11-19 PROCEDURE — 84402 ASSAY OF FREE TESTOSTERONE: CPT

## 2021-11-19 PROCEDURE — 84436 ASSAY OF TOTAL THYROXINE: CPT

## 2021-11-19 PROCEDURE — 84450 TRANSFERASE (AST) (SGOT): CPT

## 2021-11-19 PROCEDURE — 85018 HEMOGLOBIN: CPT

## 2021-11-19 PROCEDURE — 84443 ASSAY THYROID STIM HORMONE: CPT

## 2021-11-19 PROCEDURE — 84075 ASSAY ALKALINE PHOSPHATASE: CPT

## 2021-11-19 PROCEDURE — 84153 ASSAY OF PSA TOTAL: CPT

## 2021-11-19 PROCEDURE — 82681 ASSAY DIR MEAS FR ESTRADIOL: CPT

## 2021-11-19 PROCEDURE — 84403 ASSAY OF TOTAL TESTOSTERONE: CPT

## 2021-11-19 PROCEDURE — 369999 HCHG MISC LAB CHARGE

## 2021-11-19 PROCEDURE — 84460 ALANINE AMINO (ALT) (SGPT): CPT

## 2021-11-19 PROCEDURE — 85014 HEMATOCRIT: CPT

## 2021-11-19 PROCEDURE — 36415 COLL VENOUS BLD VENIPUNCTURE: CPT

## 2021-11-19 PROCEDURE — 84480 ASSAY TRIIODOTHYRONINE (T3): CPT

## 2021-11-20 LAB
ALP SERPL-CCNC: 74 U/L (ref 30–99)
ALT SERPL-CCNC: 120 U/L (ref 2–50)
AST SERPL-CCNC: 54 U/L (ref 12–45)
HCT VFR BLD AUTO: 52.1 % (ref 42–52)
HGB BLD-MCNC: 17.6 G/DL (ref 14–18)
PSA SERPL-MCNC: 0.75 NG/ML (ref 0–4)
T3 SERPL-MCNC: 77.9 NG/DL (ref 60–181)
T4 SERPL-MCNC: 3.6 UG/DL (ref 4–12)
TSH SERPL DL<=0.005 MIU/L-ACNC: 4.67 UIU/ML (ref 0.38–5.33)

## 2021-11-21 LAB
SHBG SERPL-SCNC: 44 NMOL/L (ref 11–80)
TESTOST FREE MFR SERPL: 1.7 % (ref 1.6–2.9)
TESTOST FREE SERPL-MCNC: 121 PG/ML (ref 47–244)
TESTOST SERPL-MCNC: 711 NG/DL (ref 300–890)

## 2021-11-25 LAB — MISCELLANEOUS LAB RESULT MISCLAB: NORMAL

## 2022-03-23 ENCOUNTER — HOSPITAL ENCOUNTER (OUTPATIENT)
Dept: LAB | Facility: MEDICAL CENTER | Age: 47
End: 2022-03-23
Attending: UROLOGY
Payer: COMMERCIAL

## 2022-03-23 LAB
ALP SERPL-CCNC: 62 U/L (ref 30–99)
ALT SERPL-CCNC: 41 U/L (ref 2–50)
AST SERPL-CCNC: 37 U/L (ref 12–45)
HCT VFR BLD AUTO: 52.4 % (ref 42–52)
HGB BLD-MCNC: 17.9 G/DL (ref 14–18)
PSA SERPL-MCNC: 0.84 NG/ML (ref 0–4)

## 2022-03-23 PROCEDURE — 82681 ASSAY DIR MEAS FR ESTRADIOL: CPT

## 2022-03-23 PROCEDURE — 84450 TRANSFERASE (AST) (SGOT): CPT

## 2022-03-23 PROCEDURE — 84153 ASSAY OF PSA TOTAL: CPT

## 2022-03-23 PROCEDURE — 85018 HEMOGLOBIN: CPT

## 2022-03-23 PROCEDURE — 84270 ASSAY OF SEX HORMONE GLOBUL: CPT

## 2022-03-23 PROCEDURE — 84402 ASSAY OF FREE TESTOSTERONE: CPT

## 2022-03-23 PROCEDURE — 84460 ALANINE AMINO (ALT) (SGPT): CPT

## 2022-03-23 PROCEDURE — 36415 COLL VENOUS BLD VENIPUNCTURE: CPT

## 2022-03-23 PROCEDURE — 369999 HCHG MISC LAB CHARGE

## 2022-03-23 PROCEDURE — 84403 ASSAY OF TOTAL TESTOSTERONE: CPT

## 2022-03-23 PROCEDURE — 85014 HEMATOCRIT: CPT

## 2022-03-23 PROCEDURE — 84075 ASSAY ALKALINE PHOSPHATASE: CPT

## 2022-03-28 LAB
SHBG SERPL-SCNC: 35 NMOL/L (ref 17–56)
TESTOST FREE MFR SERPL: ABNORMAL % (ref 1.6–2.9)
TESTOST FREE SERPL-MCNC: ABNORMAL PG/ML (ref 47–244)
TESTOST SERPL-MCNC: >1500 NG/DL (ref 300–890)

## 2022-03-31 LAB — MISCELLANEOUS LAB RESULT MISCLAB: NORMAL

## 2022-05-20 ENCOUNTER — OCCUPATIONAL MEDICINE (OUTPATIENT)
Dept: URGENT CARE | Facility: PHYSICIAN GROUP | Age: 47
End: 2022-05-20
Payer: COMMERCIAL

## 2022-05-20 VITALS
OXYGEN SATURATION: 98 % | SYSTOLIC BLOOD PRESSURE: 108 MMHG | WEIGHT: 256 LBS | DIASTOLIC BLOOD PRESSURE: 70 MMHG | HEIGHT: 75 IN | BODY MASS INDEX: 31.83 KG/M2 | HEART RATE: 68 BPM | TEMPERATURE: 98.4 F | RESPIRATION RATE: 16 BRPM

## 2022-05-20 DIAGNOSIS — R03.0 ELEVATED BLOOD PRESSURE READING: ICD-10-CM

## 2022-05-20 PROBLEM — M51.26 DISPLACEMENT OF LUMBAR INTERVERTEBRAL DISC WITHOUT MYELOPATHY: Status: ACTIVE | Noted: 2022-05-20

## 2022-05-20 PROBLEM — M47.817 LUMBOSACRAL SPONDYLOSIS WITHOUT MYELOPATHY: Status: ACTIVE | Noted: 2022-05-20

## 2022-05-20 PROBLEM — M51.36 DEGENERATION OF LUMBAR INTERVERTEBRAL DISC: Status: ACTIVE | Noted: 2022-05-20

## 2022-05-20 PROCEDURE — 99213 OFFICE O/P EST LOW 20 MIN: CPT | Performed by: FAMILY MEDICINE

## 2022-05-20 RX ORDER — TESTOSTERONE CYPIONATE 200 MG/ML
50 INJECTION, SOLUTION INTRAMUSCULAR ONCE
COMMUNITY

## 2022-05-20 NOTE — LETTER
"EMPLOYEE’S CLAIM FOR COMPENSATION/ REPORT OF INITIAL TREATMENT  FORM C-4    EMPLOYEE’S CLAIM - PROVIDE ALL INFORMATION REQUESTED   First Name  Manuel Last Name  Paddy Birthdate                    1975                Sex  male Claim Number (Insurer’s Use Only)   Home Address  [address] Age  46 y.o. 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  formerly [facility]  Zip  [address]    Date of Injury  5/20/2022               Hours Injury  10:00 AM Date Employer Notified  5/20/2022 Last Day of Work after Injury     or Occupational Disease  5/20/2022 Supervisor to Whom Injury     Reported  LILA/ ROBY   Address or Location of Accident (if applicable)  Work [1]   What were you doing at the time of accident? (if applicable)  HAVING AN INTERVIEW    How did this injury or occupational disease occur? (Be specific an answer in detail. Use additional sheet if necessary)  I WAS BEING GIVEN DISCIPLINE ACTION NOTICE   If you believe that you have an occupational disease, when did you first have knowledge of the disability and it relationship to your employment?   Witnesses to the Accident  ERICKSON CK=ARVIND      Nature of Injury or Occupational Disease  Workers' Compensation  Part(s) of Body Injured or Affected  Defer, N/A, N/A    I certify that the above is true and correct to the best of my knowledge and that I have provided this information in order to obtain the " benefits of [place] inclusive or Chapter 617 of [place]).  I hereby authorize any physician, chiropractor, surgeon, practitioner, or other person, any hospital, including [facility] hospital, any medical service organization, any insurance company, or other institution or organization to release to each other, any medical or other information, including benefits paid or payable, pertinent to this injury or disease, except information relative to diagnosis, treatment and/or counseling for AIDS, psychological conditions, alcohol or controlled substances, for which I must give specific authorization.  A Photostat of this authorization shall be as valid as the original.     Date   Place Employee’s Original or  *Electronic Signature   THIS REPORT MUST BE COMPLETED AND MAILED WITHIN 3 WORKING DAYS OF TREATMENT   Place  [facility]  Name of [facility]   Date  5/20/2022 Diagnosis and Description of Injury or Occupational Disease  (R03.0) Elevated blood pressure reading Is there evidence the injured employee was under the influence of alcohol and/or another controlled substance at the time of accident?  ? No ? Yes (if yes, please explain)   Hour  12:24 PM   The encounter diagnosis was Elevated blood pressure reading. No   Treatment  Elevated blood pressure reading  Likely normal, human physiological response to stress.    Blood pressure is now within normal range and he is asymptomatic     No treatment is indicated at this time, for normal human physiologic response to stress    Cleared for full duty, no restrictions    MMI    Have you advised the patient to remain off work five days or     more?    X-Ray Findings      ? Yes Indicate dates:   From   To      From information given by the employee, together with medical evidence, can        you directly connect this injury or occupational disease as job  "incurred?  Yes ? No If no, is the injured employee capable of:  ? full duty  Yes ? modified duty      Is additional medical care by a physician indicated?  Yes If Modified Duty, Specify any Limitations / Restrictions      Do you know of any previous injury or disease contributing to this condition or occupational disease?  ? Yes ? No (Explain if yes)                          No   Date  5/20/2022 Print Health Care Provider's   Deondre Rojas M.D. I certify the employer’s copy of  this form was mailed on:   Address  [address] Insurer’s Use Only     [place]  [address]    Provider’s Tax ID Number  [id number] Telephone  Dept: [phone]             Health Care Provider’s Original or Electronic Signature  e-DEONDRE Wheeler M.D. Degree (MD,DO, DC,PASaraC,APRN)  {Provider Degrees:31044}      * Complete and attach Release of Information (Form C-4A) when injured employee signs C-4 Form electronically  ORIGINAL - TREATING HEALTHCARE PROVIDER PAGE 2 - INSURER/TPA PAGE 3 - EMPLOYER PAGE 4 - EMPLOYEE             Form C-4 (rev.08/21)           BRIEF DESCRIPTION OF RIGHTS AND BENEFITS  (Pursuant to [place]RS 616C.050)    Notice of Injury or Occupational Disease (Incident Report Form C-1): If an injury or occupational disease (OD) arises out of and in the course of employment, you must provide written notice to your employer as soon as practicable, but no later than 7 days after the accident or OD. Your employer shall maintain a sufficient supply of the required forms.    Claim for Compensation (Form C-4): If medical treatment is sought, the form C-4 is available at the place of initial treatment. A completed \"Claim for Compensation\" (Form C-4) must be filed within 90 days after an accident or OD. The treating physician or chiropractor must, within 3 working days after treatment, complete and mail to the employer, the employer's insurer and third-party , the Claim for " Compensation.    Medical Treatment: If you require medical treatment for your on-the-job injury or OD, you may be required to select a physician or chiropractor from a list provided by your workers’ compensation insurer, if it has contracted with an Organization for Managed Care (MCO) or Preferred Provider Organization (PPO) or providers of health care. If your employer has not entered into a contract with an MCO or PPO, you may select a physician or chiropractor from the Panel of Physicians and Chiropractors. Any medical costs related to your industrial injury or OD will be paid by your insurer.    Temporary Total Disability (TTD): If your doctor has certified that you are unable to work for a period of at least 5 consecutive days, or 5 cumulative days in a 20-day period, or places restrictions on you that your employer does not accommodate, you may be entitled to TTD compensation.    Temporary Partial Disability (TPD): If the wage you receive upon reemployment is less than the compensation for TTD to which you are entitled, the insurer may be required to pay you TPD compensation to make up the difference. TPD can only be paid for a maximum of 24 months.    Permanent Partial Disability (PPD): When your medical condition is stable and there is an indication of a PPD as a result of your injury or OD, within 30 days, your insurer must arrange for an evaluation by a rating physician or chiropractor to determine the degree of your PPD. The amount of your PPD award depends on the date of injury, the results of the PPD evaluation, your age and wage.    Permanent Total Disability (PTD): If you are medically certified by a treating physician or chiropractor as permanently and totally disabled and have been granted a PTD status by your insurer, you are entitled to receive monthly benefits not to exceed 66 2/3% of your average monthly wage. The amount of your PTD payments is subject to reduction if you previously received a  [facility]-sum PPD award.    Vocational Rehabilitation Services: You may be eligible for vocational rehabilitation services if you are unable to return to the job due to a permanent physical impairment or permanent restrictions as a result of your injury or occupational disease.    Transportation and Per Jennifer Reimbursement: You may be eligible for travel expenses and per jennifer associated with medical treatment.    Reopening: You may be able to reopen your claim if your condition worsens after claim closure.     Appeal Process: If you disagree with a written determination issued by the insurer or the insurer does not respond to your request, you may appeal to the Department of Administration, , by following the instructions contained in your determination letter. You must appeal the determination within 70 days from the date of the determination letter at [address], [address], [place], [place] [address], or [address], [address], [place], [place] [address]. If you disagree with the  decision, you may appeal to the Department of Administration, . You must file your appeal within 30 days from the date of the  decision letter at [address], [address], [place], [place] [address], or [address], [address], [place], [place] [address]. If you disagree with a decision of an , you may file a petition for judicial review with the District Court. You must do so within 30 days of the Appeal Officer’s decision. You may be represented by an  at your own expense or you may contact the [facility] for possible representation.    [place]  for Injured Workers (NAIW): If you disagree with a  decision, you may request that NAIW represent you without charge at an  Hearing. For information regarding denial of benefits, you may contact the [facility] at: [address], [address], [place]  [address], [phone], or [address], [address], [place] [address], [phone]    To File a Complaint with the Division: If you wish to file a complaint with the  of the Division of Industrial Relations (DIR),  please contact the Workers’ Compensation Section, [address], [address], [place], [place] [address], telephone [phone]) [phone], or [address], [address], [place], [place] [address], telephone [phone].    For assistance with Workers’ Compensation Issues: You may contact the [facility] Office for Consumer Health Assistance, [address], [address], [place], [place] [address], Toll Free 1-340.319.4400, Web site: http://[facility].nv.[facility]/Programs/ARTUR E-mail: [email or web address].[email or web address]              __________________________________________________________________                                    _________________            Employee Name / Signature                                                                                                                            Date                                                                                                                                                                                                                              D-2 (rev. 10/20)

## 2022-05-20 NOTE — LETTER
"EMPLOYEE’S CLAIM FOR COMPENSATION/ REPORT OF INITIAL TREATMENT  FORM C-4    EMPLOYEE’S CLAIM - PROVIDE ALL INFORMATION REQUESTED   First Name  Manuel Last Name  Paddy Birthdate                    1975                Sex  male Claim Number (Insurer’s Use Only)    Home Address  [address] Age  46 y.o. 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  [place]  Zip  [address]    Date of Injury  5/20/2022               Hours Injury  10:00 AM Date Employer Notified  5/20/2022 Last Day of Work after Injury     or Occupational Disease  5/20/2022 Supervisor to Whom Injury     Reported  LILA/ ROBY   Address or Location of Accident (if applicable)  Work [1]   What were you doing at the time of accident? (if applicable)  HAVING AN INTERVIEW    How did this injury or occupational disease occur? (Be specific an answer in detail. Use additional sheet if necessary)  I WAS BEING GIVEN DISCIPLINE ACTION NOTICE   If you believe that you have an occupational disease, when did you first have knowledge of the disability and it relationship to your employment?   Witnesses to the Accident  ERICKSON CK=ARVIND      Nature of Injury or Occupational Disease  Workers' Compensation  Part(s) of Body Injured or Affected  Defer, N/A, N/A    I certify that the above is true and correct to the best of my knowledge and that I have provided this information in order to obtain the " benefits of [place]’s [place] ([place] 616A to 616[place], inclusive or Chapter 617 of [place]RS).  I hereby authorize any physician, chiropractor, surgeon, practitioner, or other person, any hospital, including [facility], any medical service organization, any insurance company, or other institution or organization to release to each other, any medical or other information, including benefits paid or payable, pertinent to this injury or disease, except information relative to diagnosis, treatment and/or counseling for AIDS, psychological conditions, alcohol or controlled substances, for which I must give specific authorization.  A Photostat of this authorization shall be as valid as the original.     Date 05/20/22    [facility]  Employee’s Original or  *Electronic Signature   THIS REPORT MUST BE COMPLETED AND MAILED WITHIN 3 WORKING DAYS OF TREATMENT   [facility]  Name of Facility  [place]   Date  5/20/2022 Diagnosis and Description of Injury or Occupational Disease  (R03.0) Elevated blood pressure reading Is there evidence the injured employee was under the influence of alcohol and/or another controlled substance at the time of accident?  ? No ? Yes (if yes, please explain)    Hour  12:24 PM   The encounter diagnosis was Elevated blood pressure reading. No   Treatment  Elevated blood pressure reading  Likely normal, human physiological response to stress.    Blood pressure is now within normal range and he is asymptomatic     No treatment is indicated at this time, for normal human physiologic response to stress    Cleared for full duty, no restrictions    MMI    Have you advised the patient to remain off work five days or     more?    X-Ray Findings      ? Yes Indicate dates:   From   To      From information given by the employee, together with medical evidence, can        you directly connect this injury or occupational  "disease as job incurred?  Yes ? No If no, is the injured employee capable of:  ? full duty  Yes ? modified duty      Is additional medical care by a physician indicated?  Yes If Modified Duty, Specify any Limitations / Restrictions      Do you know of any previous injury or disease contributing to this condition or occupational disease?  ? Yes ? No (Explain if yes)                          No   Date  5/20/2022 Print Health Care Provider's   Deondre Rojas M.D. I certify the employer’s copy of  this form was mailed on:   Address  [address] Insurer’s Use Only     [place]  [address]    Provider’s Tax ID Number  [id number] Telephone  Dept: [phone]             Health Care Provider’s Original or Electronic Signature  e-DEONDRE Wheeler M.D. Degree (MD,DO, DC,PA-C,APRN)   MD      * Complete and attach Release of Information (Form C-4A) when injured employee signs C-4 Form electronically  ORIGINAL - TREATING HEALTHCARE PROVIDER PAGE 2 - INSURER/TPA PAGE 3 - EMPLOYER PAGE 4 - EMPLOYEE             Form C-4 (rev.08/21)           BRIEF DESCRIPTION OF RIGHTS AND BENEFITS  ([place]ursuant to [place]RS 616C.050)    Notice of Injury or Occupational Disease (Incident Report Form C-1): If an injury or occupational disease (OD) arises out of and in the course of employment, you must provide written notice to your employer as soon as practicable, but no later than 7 days after the accident or OD. Your employer shall maintain a sufficient supply of the required forms.    Claim for Compensation (Form C-4): If medical treatment is sought, the form C-4 is available at the place of initial treatment. A completed \"Claim for Compensation\" (Form C-4) must be filed within 90 days after an accident or OD. The treating physician or chiropractor must, within 3 working days after treatment, complete and mail to the employer, the employer's insurer and third-party , the Claim for Compensation.    Medical " Treatment: If you require medical treatment for your on-the-job injury or OD, you may be required to select a physician or chiropractor from a list provided by your workers’ compensation insurer, if it has contracted with an Organization for Managed Care (MCO) or Preferred Provider Organization (PPO) or providers of health care. If your employer has not entered into a contract with an MCO or PPO, you may select a physician or chiropractor from the Panel of Physicians and Chiropractors. Any medical costs related to your industrial injury or OD will be paid by your insurer.    Temporary Total Disability (TTD): If your doctor has certified that you are unable to work for a period of at least 5 consecutive days, or 5 cumulative days in a 20-day period, or places restrictions on you that your employer does not accommodate, you may be entitled to TTD compensation.    Temporary Partial Disability (TPD): If the wage you receive upon reemployment is less than the compensation for TTD to which you are entitled, the insurer may be required to pay you TPD compensation to make up the difference. TPD can only be paid for a maximum of 24 months.    Permanent Partial Disability (PPD): When your medical condition is stable and there is an indication of a PPD as a result of your injury or OD, within 30 days, your insurer must arrange for an evaluation by a rating physician or chiropractor to determine the degree of your PPD. The amount of your PPD award depends on the date of injury, the results of the PPD evaluation, your age and wage.    Permanent Total Disability (PTD): If you are medically certified by a treating physician or chiropractor as permanently and totally disabled and have been granted a PTD status by your insurer, you are entitled to receive monthly benefits not to exceed 66 2/3% of your average monthly wage. The amount of your PTD payments is subject to reduction if you previously received a lump-sum PPD  award.    Vocational Rehabilitation Services: You may be eligible for vocational rehabilitation services if you are unable to return to the job due to a permanent physical impairment or permanent restrictions as a result of your injury or occupational disease.    Transportation and Per Jennifer Reimbursement: You may be eligible for travel expenses and per jennifer associated with medical treatment.    Reopening: You may be able to reopen your claim if your condition worsens after claim closure.     Appeal Process: If you disagree with a written determination issued by the insurer or the insurer does not respond to your request, you may appeal to the Department of Administration, , by following the instructions contained in your determination letter. You must appeal the determination within 70 days from the date of the determination letter at [address], [address], [place], [place] [address], or [address], [place], [place] [address]. If you disagree with the  decision, you may appeal to the Department of Administration, . You must file your appeal within 30 days from the date of the  decision letter at [address], [address], [place], [place] [address], or [address], [place], [place] [address]. If you disagree with a decision of an , you may file a petition for judicial review with the District Court. You must do so within 30 days of the Appeal Officer’s decision. You may be represented by an  at your own expense or you may contact the [facility] for possible representation.    [place]  for Injured Workers ([place]IW): If you disagree with a  decision, you may request that NAIW represent you without charge at an  Hearing. For information regarding denial of benefits, you may contact the [facility] at: [address], [address], [place] [address], [phone]  [phone], or [address], [address], [place] [address], [phone]    To File a Complaint with the Division: If you wish to file a complaint with the  of the Division of Industrial Relations (DIR),  please contact the Workers’ Compensation Section, [address], [address], [place], [place] [address], telephone [phone]) 823-8130, or [address], [address], [place], [place] [address], telephone [phone].    For assistance with Workers’ Compensation Issues: You may contact the [facility] Office for Consumer Health Assistance, [address], [address], [place], [place] [address], Toll Free 1-885.616.7692, Web site: http://[facility].[place].gov/Programs/ARTUR E-mail: [email or web address]              05/20/2022            __________________________________________________________________                                    _________________            Employee Name / Signature                                                                                                                            Date                                                                                                                                                                                                                              D-2 (rev. 10/20)

## 2022-05-20 NOTE — LETTER
"   [facility]  [place] [address]  Phone:  [phone] - Fax:  [phone]   [facility] Progress Report and Disability Certification  Date of Service: 5/20/2022   No Show:  No  Date / Time of Next Visit:     Claim Information   Patient Name: Manuel Thomason  Claim Number:     Employer: [employer]  Date of Injury: 5/20/2022     Insurer / TPA: Ailyn [place]  ID / SSN:     Occupation: DELIVERY  Diagnosis: The encounter diagnosis was Elevated blood pressure reading.    Medical Information   Related to Industrial Injury? Yes    Subjective Complaints:    DOI: 5/20    Got upset at work today.   States he felt lightheaded for approx 5 min, but that spontaneously resolved.   EMS was called and  BP was found to be 150/88.   Pt does not have a hx of HTN.       Pertinent negatives include no   Headache,  blurred vision, chest pain, malaise/fatigue, orthopnea, palpitations or shortness of breath.     Objective Findings: /70   Pulse 68   Temp 36.9 °C (98.4 °F) (Temporal)   Resp 16   Ht 1.905 m (6' 3\")   Wt 116 kg (256 lb)   SpO2 98%     Cardiovascular: Normal rate, regular rhythm, normal heart sounds and intact distal pulses.  Exam reveals no friction rub.     Pre-Existing Condition(s):     Assessment:   Initial Visit    Status: Discharged /  MMI  Permanent Disability:No    Plan:      Diagnostics:      Comments:       Disability Information   Status: Released to Full Duty    From:     Through:   Restrictions are:     Physical Restrictions   Sitting:    Standing:    Stooping:    Bending:      Squatting:    Walking:    Climbing:    Pushing:      Pulling:    Other:    Reaching Above Shoulder (L):   Reaching Above Shoulder (R):       Reaching Below Shoulder (L):    Reaching Below Shoulder (R):      Not to exceed Weight Limits   Carrying(hrs):   Weight Limit(lb):   Lifting(hrs):   Weight  Limit(lb):     Comments: Elevated blood pressure reading  Likely " normal, human physiological response to stress.    Blood pressure is now within normal range and he is asymptomatic     No treatment is indicated at this time, for normal human physiologic response to stress    Cleared for full duty, no restrictions    MMI      Repetitive Actions   Hands: i.e. Fine Manipulations from Grasping:     Feet: i.e. Operating Foot Controls:     Driving / Operate Machinery:     Health Care Provider’s Original or Electronic Signature  Deondre Rojas M.D. Health Care Provider’s Original or Electronic Signature    Sahil Chatterjee MD         Clinic Name / Location: [address] [address] Clinic Phone Number: Dept: [phone]   Appointment Time: 12:30 Pm Visit Start Time: 12:24 PM   Check-In Time:  12:19 Pm Visit Discharge Time:  12:44 PM   Original-Treating Physician or Chiropractor    Page 2-Insurer/TPA    Page 3-Employer    Page 4-Employee

## 2022-05-20 NOTE — LETTER
"EMPLOYEE’S CLAIM FOR COMPENSATION/ REPORT OF INITIAL TREATMENT  FORM C-4    EMPLOYEE’S CLAIM - PROVIDE ALL INFORMATION REQUESTED   First Name  Manuel Last Name  Paddy Birthdate                    1975                Sex  male Claim Number (Insurer’s Use Only)   Home Address  [address] Age  46 y.o. 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 (if applicable)  HAVING AN INTERVIEW    How did this injury or occupational disease occur? (Be specific an answer in detail. Use additional sheet if necessary)  I WAS BEING GIVEN DISCIPLINE ACTION NOTICE   If you believe that you have an occupational disease, when did you first have knowledge of the disability and it relationship to your employment?   Witnesses to the Accident  ERICKSON CK=ARVIND      Nature of Injury or Occupational Disease  Workers' Compensation  Part(s) of Body Injured or Affected  Defer, N/A, N/A    I certify that the above is true and correct to the best of my knowledge and that I have provided this information in order to obtain the " benefits of [place]).  I hereby authorize any physician, chiropractor, surgeon, practitioner, or other person, any hospital, including [facility] hospital, any medical service organization, any insurance company, or other institution or organization to release to each other, any medical or other information, including benefits paid or payable, pertinent to this injury or disease, except information relative to diagnosis, treatment and/or counseling for AIDS, psychological conditions, alcohol or controlled substances, for which I must give specific authorization.  A Photostat of this authorization shall be as valid as the original.     Date   Place Employee’s Original or  *Electronic Signature   THIS REPORT MUST BE COMPLETED AND MAILED WITHIN 3 WORKING DAYS OF TREATMENT   Place  [facility]  Name of [facility]   Date  5/20/2022 Diagnosis and Description of Injury or Occupational Disease  (R03.0) Elevated blood pressure reading Is there evidence the injured employee was under the influence of alcohol and/or another controlled substance at the time of accident?  ? No ? Yes (if yes, please explain)   Hour  12:24 PM   The encounter diagnosis was Elevated blood pressure reading. No   Treatment  Elevated blood pressure reading  Likely normal, human physiological response to stress.    Blood pressure is now within normal range and he is asymptomatic     No treatment is indicated at this time, for normal human physiologic response to stress    Cleared for full duty, no restrictions    MMI    Have you advised the patient to remain off work five days or     more?    X-Ray Findings      ? Yes Indicate dates:   From   To      From information given by the employee, together with medical evidence, can        you directly connect this injury or occupational disease as job  "incurred?  Yes ? No If no, is the injured employee capable of:  ? full duty  Yes ? modified duty      Is additional medical care by a physician indicated?  Yes If Modified Duty, Specify any Limitations / Restrictions      Do you know of any previous injury or disease contributing to this condition or occupational disease?  ? Yes ? No (Explain if yes)                          No   Date  5/20/2022 Print Health Care Provider's   Deondre Rojas M.D. I certify the employer’s copy of  this form was mailed on:   Address  [address] Insurer’s Use Only     [place]  [address]    Provider’s Tax ID Number  [id number] Telephone  Dept: [phone]             Health Care Provider’s Original or Electronic Signature  e-DEONDRE Wheeler M.D. Degree (MD,DO, DC,PASaraC,APRN)  {Provider Degrees:80840}      * Complete and attach Release of Information (Form C-4A) when injured employee signs C-4 Form electronically  ORIGINAL - TREATING HEALTHCARE PROVIDER PAGE 2 - INSURER/TPA PAGE 3 - EMPLOYER PAGE 4 - EMPLOYEE             Form C-4 (rev.08/21)           BRIEF DESCRIPTION OF RIGHTS AND BENEFITS  (Pursuant to [place] 616C.050)    Notice of Injury or Occupational Disease (Incident Report Form C-1): If an injury or occupational disease (OD) arises out of and in the course of employment, you must provide written notice to your employer as soon as practicable, but no later than 7 days after the accident or OD. Your employer shall maintain a sufficient supply of the required forms.    Claim for Compensation (Form C-4): If medical treatment is sought, the form C-4 is available at the place of initial treatment. A completed \"Claim for Compensation\" (Form C-4) must be filed within 90 days after an accident or OD. The treating physician or chiropractor must, within 3 working days after treatment, complete and mail to the employer, the employer's insurer and third-party , the Claim for " Compensation.    Medical Treatment: If you require medical treatment for your on-the-job injury or OD, you may be required to select a physician or chiropractor from a list provided by your workers’ compensation insurer, if it has contracted with an Organization for Managed Care (MCO) or Preferred Provider Organization (PPO) or providers of health care. If your employer has not entered into a contract with an MCO or PPO, you may select a physician or chiropractor from the Panel of Physicians and Chiropractors. Any medical costs related to your industrial injury or OD will be paid by your insurer.    Temporary Total Disability (TTD): If your doctor has certified that you are unable to work for a period of at least 5 consecutive days, or 5 cumulative days in a 20-day period, or places restrictions on you that your employer does not accommodate, you may be entitled to TTD compensation.    Temporary Partial Disability (TPD): If the wage you receive upon reemployment is less than the compensation for TTD to which you are entitled, the insurer may be required to pay you TPD compensation to make up the difference. TPD can only be paid for a maximum of 24 months.    Permanent Partial Disability (PPD): When your medical condition is stable and there is an indication of a PPD as a result of your injury or OD, within 30 days, your insurer must arrange for an evaluation by a rating physician or chiropractor to determine the degree of your PPD. The amount of your PPD award depends on the date of injury, the results of the PPD evaluation, your age and wage.    Permanent Total Disability (PTD): If you are medically certified by a treating physician or chiropractor as permanently and totally disabled and have been granted a PTD status by your insurer, you are entitled to receive monthly benefits not to exceed 66 2/3% of your average monthly wage. The amount of your PTD payments is subject to reduction if you previously received a  [facility]-sum PPD award.    Vocational Rehabilitation Services: You may be eligible for vocational rehabilitation services if you are unable to return to the job due to a permanent physical impairment or permanent restrictions as a result of your injury or occupational disease.    Transportation and Per Jennifer Reimbursement: You may be eligible for travel expenses and per jennifer associated with medical treatment.    Reopening: You may be able to reopen your claim if your condition worsens after claim closure.     Appeal Process: If you disagree with a written determination issued by the insurer or the insurer does not respond to your request, you may appeal to the Department of Administration, , by following the instructions contained in your determination letter. You must appeal the determination within 70 days from the date of the determination letter at [address], [address], [place], [place] [address], or [address], [address], [place], [place] [address]. If you disagree with the  decision, you may appeal to the Department of Administration, . You must file your appeal within 30 days from the date of the  decision letter at [address], [address], [place], [place] [address], or [address], [address], [place], [place] [address]. If you disagree with a decision of an , you may file a petition for judicial review with the District Court. You must do so within 30 days of the Appeal Officer’s decision. You may be represented by an  at your own expense or you may contact the [facility] for possible representation.    [place]  for Injured Workers (NAIW): If you disagree with a  decision, you may request that NAIW represent you without charge at an  Hearing. For information regarding denial of benefits, you may contact the [facility] at: [address], [address], [place]  [address], [phone], or [address], [address], [place] [address], [phone]    To File a Complaint with the Division: If you wish to file a complaint with the  of the Division of Industrial Relations (DIR),  please contact the Workers’ Compensation Section, [address], [address], [place], [place] [address], telephone [phone], or [address], [address], [place], [place] [address], telephone [phone].    For assistance with Workers’ Compensation Issues: You may contact the [facility] Office for Consumer Health Assistance, [address], [address], [place], [place] [address], Toll Free 1-393.568.9159, Web site: [email or web address] E-mail: [email or web address]              __________________________________________________________________                                    _________________            Employee Name / Signature                                                                                                                            Date                                                                                                                                                                                                                              D-2 (rev. 10/20)

## 2022-05-20 NOTE — LETTER
"   [facility] [place] [address]  Phone:  [phone] - Fax:  [phone]   [facility] Progress Report and Disability Certification  Date of Service: 5/20/2022   No Show:  No  Date / Time of Next Visit:     Claim Information   Patient Name: Manuel Thomason  Claim Number:     Employer: [employer] *** Date of Injury: 5/20/2022     Insurer / TPA: [place]/breana [place] *** ID / SSN:     Occupation: DELIVERY *** Diagnosis: The encounter diagnosis was Elevated blood pressure reading.    Medical Information   Related to Industrial Injury? Yes ***   Subjective Complaints:    DOI: 5/20    Got upset at work today.   States he felt lightheaded for approx 5 min, but that spontaneously resolved.   EMS was called and  BP was found to be 150/88.   Pt does not have a hx of HTN.       Pertinent negatives include no   Headache,  blurred vision, chest pain, malaise/fatigue, orthopnea, palpitations or shortness of breath.     Objective Findings: /70   Pulse 68   Temp 36.9 °C (98.4 °F) (Temporal)   Resp 16   Ht 1.905 m (6' 3\")   Wt 116 kg (256 lb)   SpO2 98%     Cardiovascular: Normal rate, regular rhythm, normal heart sounds and intact distal pulses.  Exam reveals no friction rub.     Pre-Existing Condition(s):     Assessment:   Initial Visit    Status: Discharged /  MMI  Permanent Disability:No    Plan:      Diagnostics:      Comments:       Disability Information   Status: Released to Full Duty    From:     Through:   Restrictions are:     Physical Restrictions   Sitting:    Standing:    Stooping:    Bending:      Squatting:    Walking:    Climbing:    Pushing:      Pulling:    Other:    Reaching Above Shoulder (L):   Reaching Above Shoulder (R):       Reaching Below Shoulder (L):    Reaching Below Shoulder (R):      Not to exceed Weight Limits   Carrying(hrs):   Weight Limit(lb):   Lifting(hrs):   Weight  Limit(lb):     Comments: Elevated blood pressure " reading  Likely normal, human physiological response to stress.    Blood pressure is now within normal range and he is asymptomatic     No treatment is indicated at this time, for normal human physiologic response to stress    Cleared for full duty, no restrictions    MMI      Repetitive Actions   Hands: i.e. Fine Manipulations from Grasping:     Feet: i.e. Operating Foot Controls:     Driving / Operate Machinery:     Health Care Provider’s Original or Electronic Signature  Deondre Rojas M.D. Health Care Provider’s Original or Electronic Signature    Sahil Chatterjee MD         Clinic Name / Location: [address] [address] Clinic Phone Number: Dept: [phone]   Appointment Time: 12:30 Pm Visit Start Time: 12:24 PM   Check-In Time:  12:19 Pm Visit Discharge Time:  ***   Original-Treating Physician or Chiropractor    Page 2-Insurer/TPA    Page 3-Employer    Page 4-Employee

## 2022-05-20 NOTE — PROGRESS NOTES
Chief Complaint   Patient presents with   • Hypertension     Got upset at work an his blood pressure went up, they called EMS it was 150 88 .  Needs to be cleared to go back to work         HPI:      DOI: 5/20    Got upset at work today.   States he felt lightheaded for approx 5 min, but that spontaneously resolved.   EMS was called and  BP was found to be 150/88.   Pt does not have a hx of HTN.       Pertinent negatives include no   Headache,  blurred vision, chest pain, malaise/fatigue, orthopnea, palpitations or shortness of breath.           Social History     Socioeconomic History   • Marital status:      Spouse name: Not on file   • Number of children: Not on file   • Years of education: Not on file   • Highest education level: Not on file   Occupational History   • Not on file   Tobacco Use   • Smoking status: Never Smoker   • Smokeless tobacco: Never Used   Substance and Sexual Activity   • Alcohol use: Yes     Comment: rare   • Drug use: Never   • Sexual activity: Not on file   Other Topics Concern   • Not on file   Social History Narrative   • Not on file     Social Determinants of Health     Financial Resource Strain: Not on file   Food Insecurity: Not on file   Transportation Needs: Not on file   Physical Activity: Not on file   Stress: Not on file   Social Connections: Not on file   Intimate Partner Violence: Not on file   Housing Stability: Not on file       Current Outpatient Medications on File Prior to Visit   Medication Sig Dispense Refill   • testosterone cypionate (DEPO-TESTOSTERONE) 200 MG/ML Solution injection Inject 50 mg into the shoulder, thigh, or buttocks one time.     • levothyroxine (SYNTHROID) 125 MCG Tab Take 125 mcg by mouth Every morning on an empty stomach.     • diphenhydrAMINE HCl (BENADRYL ALLERGY PO)  (Patient not taking: Reported on 5/20/2022)     • Methocarbamol (ROBAXIN PO)  (Patient not taking: Reported on 5/20/2022)     • HYDROcodone-Acetaminophen (NORCO PO)   "(Patient not taking: Reported on 5/20/2022)     • Pregabalin (LYRICA PO)  (Patient not taking: Reported on 5/20/2022)     • amitriptyline (ELAVIL) 25 MG Tab Take 25 mg by mouth. (Patient not taking: No sig reported)  1   • amitriptyline (ELAVIL) 10 MG Tab Take 10 mg by mouth. (Patient not taking: No sig reported)  0   • gabapentin (NEURONTIN) 400 MG Cap Take 400 mg by mouth. (Patient not taking: No sig reported)  0   • gabapentin (NEURONTIN) 300 MG Cap Take 300 mg by mouth. (Patient not taking: No sig reported)  0   • ibuprofen (MOTRIN) 800 MG Tab Take 800 mg by mouth. (Patient not taking: No sig reported)  0   • lidocaine (LIDODERM) 5 % Patch 1 Patch every day. LEAVE ON FOR 12 HOURS AND THEN OFF FOR 12 HOURS. (Patient not taking: No sig reported)  0     No current facility-administered medications on file prior to visit.         Past Medical History:   Diagnosis Date   • Deviated septum 2014         Review of Systems   Constitutional: Negative for fever and malaise/fatigue.   Eyes: Negative for blurred vision and double vision.   Respiratory: Negative for cough and shortness of breath.    Cardiovascular: Negative for chest pain, palpitations, orthopnea and claudication.   Gastrointestinal: Negative for nausea and vomiting.   Neurological: negative for headaches Negative for dizziness and tingling.   All other systems reviewed and are negative.         Objective:      /70   Pulse 68   Temp 36.9 °C (98.4 °F) (Temporal)   Resp 16   Ht 1.905 m (6' 3\")   Wt 116 kg (256 lb)   SpO2 98%       Physical Exam   Constitutional: pt is oriented to person, place, and time. Pt appears well-developed and well-nourished. No distress.   HENT:   Head: Normocephalic and atraumatic.   Mouth/Throat:   Eyes: PERRLA, EOMI  Cardiovascular: Normal rate, regular rhythm, normal heart sounds and intact distal pulses.  Exam reveals no friction rub.    No murmur heard.  Pulmonary/Chest: Effort normal and breath sounds normal. No " respiratory distress. Pt has no wheezes. Pt has no rales.   Neurological: pt is alert and oriented to person, place, and time. No cranial nerve deficit.   Skin: Skin is warm. Pt is not diaphoretic. No erythema.   Psychiatric: pt behavior is normal.   Nursing note and vitals reviewed.              Assessment/Plan:      1. Elevated blood pressure reading  Likely normal, human physiological response to stress.    Blood pressure is now within normal range and he is asymptomatic     No treatment is indicated at this time, for normal human physiologic response to stress    Cleared for full duty, no restrictions    MMI

## 2022-06-14 ENCOUNTER — HOSPITAL ENCOUNTER (OUTPATIENT)
Dept: LAB | Facility: MEDICAL CENTER | Age: 47
End: 2022-06-14
Attending: UROLOGY
Payer: COMMERCIAL

## 2022-06-14 LAB
ALP SERPL-CCNC: 63 U/L (ref 30–99)
ALT SERPL-CCNC: 37 U/L (ref 2–50)
AST SERPL-CCNC: 36 U/L (ref 12–45)
ESTRADIOL SERPL-MCNC: 26.2 PG/ML
HCT VFR BLD AUTO: 52.9 % (ref 42–52)
HGB BLD-MCNC: 18.2 G/DL (ref 14–18)
PSA SERPL-MCNC: 0.84 NG/ML (ref 0–4)
T3 SERPL-MCNC: 116 NG/DL (ref 60–181)
T4 SERPL-MCNC: 7.8 UG/DL (ref 4–12)
TSH SERPL DL<=0.005 MIU/L-ACNC: 3.67 UIU/ML (ref 0.38–5.33)

## 2022-06-14 PROCEDURE — 84480 ASSAY TRIIODOTHYRONINE (T3): CPT

## 2022-06-14 PROCEDURE — 84270 ASSAY OF SEX HORMONE GLOBUL: CPT

## 2022-06-14 PROCEDURE — 84436 ASSAY OF TOTAL THYROXINE: CPT

## 2022-06-14 PROCEDURE — 84443 ASSAY THYROID STIM HORMONE: CPT

## 2022-06-14 PROCEDURE — 84402 ASSAY OF FREE TESTOSTERONE: CPT

## 2022-06-14 PROCEDURE — 84075 ASSAY ALKALINE PHOSPHATASE: CPT

## 2022-06-14 PROCEDURE — 84305 ASSAY OF SOMATOMEDIN: CPT

## 2022-06-14 PROCEDURE — 84403 ASSAY OF TOTAL TESTOSTERONE: CPT

## 2022-06-14 PROCEDURE — 85018 HEMOGLOBIN: CPT

## 2022-06-14 PROCEDURE — 84153 ASSAY OF PSA TOTAL: CPT

## 2022-06-14 PROCEDURE — 36415 COLL VENOUS BLD VENIPUNCTURE: CPT

## 2022-06-14 PROCEDURE — 85014 HEMATOCRIT: CPT

## 2022-06-14 PROCEDURE — 82670 ASSAY OF TOTAL ESTRADIOL: CPT

## 2022-06-14 PROCEDURE — 84450 TRANSFERASE (AST) (SGOT): CPT

## 2022-06-14 PROCEDURE — 84460 ALANINE AMINO (ALT) (SGPT): CPT

## 2022-06-16 LAB
IGF-I SERPL-MCNC: 220 NG/ML (ref 76–230)
IGF-I Z-SCORE SERPL: 1.7
SHBG SERPL-SCNC: 37 NMOL/L (ref 17–56)
TESTOST FREE MFR SERPL: ABNORMAL % (ref 1.6–2.9)
TESTOST FREE SERPL-MCNC: ABNORMAL PG/ML (ref 47–244)
TESTOST SERPL-MCNC: >1500 NG/DL (ref 300–890)

## 2022-10-24 ENCOUNTER — OCCUPATIONAL MEDICINE (OUTPATIENT)
Dept: URGENT CARE | Facility: PHYSICIAN GROUP | Age: 47
End: 2022-10-24
Payer: COMMERCIAL

## 2022-10-24 VITALS
DIASTOLIC BLOOD PRESSURE: 84 MMHG | TEMPERATURE: 98.5 F | WEIGHT: 253 LBS | HEART RATE: 72 BPM | OXYGEN SATURATION: 96 % | RESPIRATION RATE: 16 BRPM | HEIGHT: 74 IN | SYSTOLIC BLOOD PRESSURE: 150 MMHG | BODY MASS INDEX: 32.47 KG/M2

## 2022-10-24 DIAGNOSIS — F07.81 POST CONCUSSIVE SYNDROME: ICD-10-CM

## 2022-10-24 DIAGNOSIS — R74.01 ELEVATED AST (SGOT): ICD-10-CM

## 2022-10-24 DIAGNOSIS — R74.8 ELEVATED CK: ICD-10-CM

## 2022-10-24 DIAGNOSIS — S06.0X1D CONCUSSION WITH LOSS OF CONSCIOUSNESS OF 30 MINUTES OR LESS, SUBSEQUENT ENCOUNTER: ICD-10-CM

## 2022-10-24 DIAGNOSIS — R79.9 ELEVATED BUN: ICD-10-CM

## 2022-10-24 DIAGNOSIS — S09.90XD CLOSED HEAD INJURY, SUBSEQUENT ENCOUNTER: ICD-10-CM

## 2022-10-24 DIAGNOSIS — R74.01 ELEVATED ALT MEASUREMENT: ICD-10-CM

## 2022-10-24 PROCEDURE — 99213 OFFICE O/P EST LOW 20 MIN: CPT | Performed by: NURSE PRACTITIONER

## 2022-10-24 ASSESSMENT — ENCOUNTER SYMPTOMS
MYALGIAS: 0
VOMITING: 0
COUGH: 0
LOSS OF CONSCIOUSNESS: 1
FEVER: 0
HEADACHES: 1
ORTHOPNEA: 0
DIZZINESS: 1
PALPITATIONS: 0
ABDOMINAL PAIN: 0
SHORTNESS OF BREATH: 0
CHILLS: 0
NAUSEA: 0

## 2022-10-24 NOTE — PROGRESS NOTES
Subjective     Manuel Thomason is a 47 y.o. male who presents with Work-Related Injury (Took boxes off conveyer belt to load into truck and hit metal doorframe of his truck and hit top of head- passed out/ was seen at [place] ( was on Thursday around 9:10 am)), Dizziness, Lightheadedness, and Headache (Headache and pain at location of injury)      DOI 10/20/22; 1st visit following initial care in the ED.  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  He struck his head on the metal frame of the low doorway and had an immediate syncopal episode that was observed by coworkers.  He was unconscious for less than 30 seconds.  He was transported by ambulance to [place] ED.  ED notes reviewed today and of note, it was not recorded that he struck his head preceeding the syncopal event and he was evaluated as if it were an unprovoked syncope.  He reports in the ED he was treated symptomatically and discharged with instructions to rest.  He did not have a head CT or neck imaging performed. EKG was obtained with nonspecific t-wave changes and he was advised to follow up with cardiology.  However, this follow up was not noted on the C4 form and no referral was placed.  Lab review from ED notes discussed with Manuel.  He was aware of the elevated AST (56) and ALT (103) as he has had high liver enzymes historically.  He did have a high CK, total (924) in the ED but it does not seem to have been addressed.  He denies any muscle cramping, abdominal pain or nausea/vomiting.  He is a weight  and active athlete.  He denies any history of rhabdomyelosis.  BUN was 31.  GFR was 80.  ED discharge noted polythecemia.  However, on review, RBC was marginally elevated at 6.02 with a slightly high HGC and .7 and 54.0 respectively) and likely insignificant variations.  Troponin was normal.  NT-proBNP was 7. Nonfasting glucose 122.  No abnormalities in sodium or potassium.  TSH and free T4  "unremarkable.    Today:   Manuel reports feeling persistent post-cocusive symptoms with a dull headache at the top of his head at impact site, mental fogginess, dizziness, and fatigue as primary complaints.   He reports feeling off balance, has intermittent tingling in the limbs, and feels more emotional with ireitability, sadness, and nervousness.  Post-concussion symptom scale is 11/21 for symptoms and 24/126 for severity of symptoms.  He denies any eye pain, vision  changes, chest pain, shortness of breath, neck pain above normal baseline, difficulty speaking or swallowing, changes in orientation, limb weakness, or any subsequent syncopal or pre-syncopal events since date of injury.  No nausea or vomiting.  Not taking any meds to treat the symptoms.  No history of significant head injury or concussion.  He does have an ongoing work comp case from a prior Spinal injury in [place] with L4-5- S1 fusion and chronic neck pain.  He reports these prior issues did not affect his ability to perform his current job and are not related to his current symptoms or complaint.        HPI    Review of Systems   Constitutional:  Positive for malaise/fatigue. Negative for chills and fever.   Respiratory:  Negative for cough and shortness of breath.    Cardiovascular:  Negative for chest pain, palpitations, orthopnea and leg swelling.   Gastrointestinal:  Negative for abdominal pain, nausea and vomiting.   Musculoskeletal:  Negative for myalgias.   Neurological:  Positive for dizziness, loss of consciousness and headaches.     Medications, Allergies, and current problem list reviewed today in Epic         Objective     Blood Pressure (Abnormal) 150/84 (BP Location: Right arm)   Pulse 72   Temperature 36.9 °C (98.5 °F) (Temporal)   Respiration 16   Height 1.873 m (6' 1.75\")   Weight 115 kg (253 lb) Comment: with shoes  Oxygen Saturation 96%   Body Mass Index 32.70 kg/m²      Physical Exam    Manuel is alert, oriented, and in " no acute distress.  GCS 15.  Head: TTP at the crown at impact site with low metal door frame.  No contusion or open wound.  No bony instability or crepitus.  No riddle sign or drainage from ears or nose.  PERRL.  Mild photosensitivity noted.  Speech is regular.  Good historian.  Limb NV, sensation and strength intact.  No gait abnormalities.  No neck TTP.  Neck ROM grossly intact.  Heart RRR, S1, S2.  Lungs clear to auscultation.                     Assessment & Plan        1. Post concussive syndrome        2. Closed head injury, subsequent encounter        3. Concussion with loss of consciousness of 30 minutes or less, subsequent encounter        4. Elevated CK        5. Elevated BUN        6. Elevated AST (SGOT)        7. Elevated ALT measurement          Discussed exam findings with Manuel.  Post concussive syndrome reviewed.  Differential reviewed.  OTC analgesics prn pain.  Work restrictions reviewed.  Follow up in 1 week, sooner if worse.    Elevated CK, BUN, AST, and ALT likely incidental findings but warrant further evaluation with PCP.  Maintain adequate po hydration and follow up with PCP for repeat CBC within 1-2 weeks.  Avoid strenuous physical activity pending further evaluation with PCP.  Strong ED precautions reviewed.  He verbalized understanding of and agreed with plan of care.

## 2022-10-24 NOTE — LETTER
[facility]  [place] [address]  Phone:  [phone] - Fax:  [phone]   [facility] Progress Report and Disability Certification  Date of Service: 10/24/2022   No Show:  No  Date / Time of Next Visit: 10/31/2022   Claim Information   Patient Name: Manuel Thomason  Claim Number:     Employer: [employer]  Date of Injury: 10/20/2022     Insurer / TPA: Ailyn Dulzura  ID / SSN:     Occupation: Package   Diagnosis: Diagnoses of Post concussive syndrome, Closed head injury, subsequent encounter, and Concussion with loss of consciousness of 30 minutes or less, subsequent encounter were pertinent to this visit.    Medical Information   Related to Industrial Injury? Yes    Subjective Complaints:  DOI 10/20/22; 1st visit following initial care in the ED.  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  He struck his head on the metal frame of the low doorway and had an immediate syncopal episode that was observed by coworkers.  He was unconscious for less than 30 seconds.  He was transported by ambulance to [place] ED.  ED notes reviewed today and of note, it was not recorded that he struck his head preceeding the syncopal event and he was evaluated as if it were an unprovoked syncope.  He reports in the ED he was treated symptomatically and discharged with instructions to rest.  He did not have a head CT or neck imaging performed. EKG was obtained with nonspecific t-wave changes and he was advised to follow up with cardiology.  However, this follow up was not noted on the C4 form and no referral was placed.  Lab review from ED notes discussed with Manuel.  He was aware of the elevated AST (56) and ALT (103) as he has had high liver enzymes historically.  He did have a high CK, total (924) in the ED but it does not seem to have been addressed.  He denies any muscle cramping, abdominal pain or nausea/vomiting.  He  is a weight  and active athlete.  He denies any history of rhabdomyelosis.  BUN was 31.  GFR was 80.  ED discharge noted polythecemia.  However, on review, RBC was marginally elevated at 6.02 with a slightly high HGC and .7 and 54.0 respectively) and likely insignificant variations.  Troponin was normal.  NT-proBNP was 7. Nonfasting glucose 122.  No abnormalities in sodium or potassium.  TSH and free T4 unremarkable.    Today:   Manuel reports feeling persistent post-cocusive symptoms with a dull headache at the top of his head at impact site, mental fogginess, dizziness, and fatigue as primary complaints.   He reports feeling off balance, has intermittent tingling in the limbs, and feels more emotional with ireitability, sadness, and nervousness.  Post-concussion symptom scale is 11/21 for symptoms and 24/126 for severity of symptoms.  He denies any eye pain, vision  changes, chest pain, shortness of breath, neck pain above normal baseline, difficulty speaking or swallowing, changes in orientation, limb weakness, or any subsequent syncopal or pre-syncopal events since date of injury.  No nausea or vomiting.  Not taking any meds to treat the symptoms.  No history of significant head injury or concussion.  He does have an ongoing work comp case from a prior Spinal injury in [place] with L4-5- S1 fusion and chronic neck pain.  He reports these prior issues did not affect his ability to perform his current job and are not related to his current symptoms or complaint.      Objective Findings: Manuel is alert, oriented, and in no acute distress.  GCS 15.  Head: TTP at the crown at impact site with low metal door frame.  No contusion or open wound.  No bony instability or crepitus.  No riddle sign or drainage from ears or nose.  PERRL.  Mild photosensitivity noted.  Speech is regular.  Good historian.  Limb NV, sensation and strength intact.  No gait abnormalities.  No neck TTP.  Neck ROM grossly intact.   Heart RRR, S1, S2.  Lungs clear to auscultation.     Pre-Existing Condition(s):     Assessment:   Initial Visit    Status: Additional Care Required  Comments:Follow up in 1 week, sooner if worse.  Permanent Disability:No    Plan: Medication  Comments:OTC analgesics prn pain.    Diagnostics:      Comments:  Abnormal AST, ALT, BUN, and CK, total in ED likely incidental findings.  Follow up with PCP for further evaluation.  Maintain adequate hydration.  Limit any strenuous weight lifting pending PCP consult.  ED precautions reviewed.      Disability Information   Status: Released to Restricted Duty    From:  10/24/2022  Through: 10/31/2022 Restrictions are: Temporary   Physical Restrictions   Sitting:    Standing:    Stooping:    Bending:      Squatting:    Walking:    Climbing:    Pushing:      Pulling:    Other:    Reaching Above Shoulder (L):   Reaching Above Shoulder (R):       Reaching Below Shoulder (L):    Reaching Below Shoulder (R):      Not to exceed Weight Limits   Carrying(hrs):   Weight Limit(lb): < or = to 25 pounds Lifting(hrs):   Weight  Limit(lb): < or = to 25 pounds   Comments: Post concussive protocol with noted dizziness as a symptom:   Limit physical and mental strain.  No performing safety sensitive tasks.  No operating heavy machinery.  Limit lift and carry to 25 pounds.  Limit screen time to 1 hour per day.  Primarily seated work.      Repetitive Actions   Hands: i.e. Fine Manipulations from Grasping:     Feet: i.e. Operating Foot Controls:     Driving / Operate Machinery: 0 hrs/day   Health Care Provider’s Original or Electronic Signature  CLARA Perkins. Health Care Provider’s Original or Electronic Signature    Sahil Chatterjee MD         Clinic Name / Location: [address] [address] Clinic Phone Number: Dept: [phone]   Appointment Time: 10:30 Am Visit Start Time: 11:22 AM   Check-In Time:  10:18 Am Visit Discharge Time:  12:48 PM    Original-Treating Physician or Chiropractor    Page 2-Insurer/TPA    Page 3-Employer    Page 4-Employee

## 2022-10-31 ENCOUNTER — OFFICE VISIT (OUTPATIENT)
Dept: URGENT CARE | Facility: PHYSICIAN GROUP | Age: 47
End: 2022-10-31
Payer: COMMERCIAL

## 2022-10-31 ENCOUNTER — OCCUPATIONAL MEDICINE (OUTPATIENT)
Dept: URGENT CARE | Facility: PHYSICIAN GROUP | Age: 47
End: 2022-10-31
Payer: COMMERCIAL

## 2022-10-31 VITALS
BODY MASS INDEX: 31.57 KG/M2 | HEIGHT: 74 IN | RESPIRATION RATE: 18 BRPM | DIASTOLIC BLOOD PRESSURE: 68 MMHG | TEMPERATURE: 98.2 F | SYSTOLIC BLOOD PRESSURE: 128 MMHG | HEART RATE: 76 BPM | OXYGEN SATURATION: 96 % | WEIGHT: 246 LBS

## 2022-10-31 VITALS
SYSTOLIC BLOOD PRESSURE: 128 MMHG | BODY MASS INDEX: 31.57 KG/M2 | TEMPERATURE: 98.2 F | WEIGHT: 246 LBS | HEIGHT: 74 IN | OXYGEN SATURATION: 96 % | RESPIRATION RATE: 18 BRPM | DIASTOLIC BLOOD PRESSURE: 68 MMHG | HEART RATE: 76 BPM

## 2022-10-31 DIAGNOSIS — S09.90XD CLOSED HEAD INJURY, SUBSEQUENT ENCOUNTER: ICD-10-CM

## 2022-10-31 DIAGNOSIS — F07.81 POST CONCUSSIVE SYNDROME: ICD-10-CM

## 2022-10-31 PROCEDURE — 99214 OFFICE O/P EST MOD 30 MIN: CPT | Performed by: PHYSICIAN ASSISTANT

## 2022-10-31 ASSESSMENT — ENCOUNTER SYMPTOMS
HEADACHES: 1
DOUBLE VISION: 0
EYE DISCHARGE: 0
PHOTOPHOBIA: 0
CHILLS: 0
FEVER: 0
DIZZINESS: 1
EYE REDNESS: 0
BLURRED VISION: 1
EYE PAIN: 0

## 2022-10-31 NOTE — LETTER
"   [facility] [place] [address]  Phone:  [phone] - Fax:  [phone]   [facility] Progress Report and Disability Certification  Date of Service: 10/31/2022   No Show:  No  Date / Time of Next Visit: 11/7/2022   Claim Information   Patient Name: Manuel Thomason  Claim Number:     Employer: [employer]  Date of Injury: 10/20/2022     Insurer / TPA: Ailyn Wynne  ID / SSN:     Occupation: Package   Diagnosis: Diagnoses of Post concussive syndrome and Closed head injury, subsequent encounter were pertinent to this visit.    Medical Information   Related to Industrial Injury? Yes    Subjective Complaints:  Initial Visit Copied \" DOI 10/20/22; 1st visit following initial care in the ED.  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  He struck his head on the metal frame of the low doorway and had an immediate syncopal episode that was observed by coworkers.  He was unconscious for less than 30 seconds.  He was transported by ambulance to [place] ED.  ED notes reviewed today and of note, it was not recorded that he struck his head preceeding the syncopal event and he was evaluated as if it were an unprovoked syncope.  He reports in the ED he was treated symptomatically and discharged with instructions to rest.  He did not have a head CT or neck imaging performed. EKG was obtained with nonspecific t-wave changes and he was advised to follow up with cardiology.  However, this follow up was not noted on the C4 form and no referral was placed.  Lab review from ED notes discussed with Manuel.  He was aware of the elevated AST (56) and ALT (103) as he has had high liver enzymes historically.  He did have a high CK, total (924) in the ED but it does not seem to have been addressed.  He denies any muscle cramping, abdominal pain or nausea/vomiting.  He is a weight  and active athlete.  He denies any history " "of rhabdomyelosis.  BUN was 31.  GFR was 80.  ED discharge noted polythecemia.  However, on review, RBC was marginally elevated at 6.02 with a slightly high HGC and .7 and 54.0 respectively) and likely insignificant variations.  Troponin was normal.  NT-proBNP was 7. Nonfasting glucose 122.  No abnormalities in sodium or potassium.  TSH and free T4 unremarkable.    Today:   Manuel reports feeling persistent post-cocusive symptoms with a dull headache at the top of his head at impact site, mental fogginess, dizziness, and fatigue as primary complaints.   He reports feeling off balance, has intermittent tingling in the limbs, and feels more emotional with ireitability, sadness, and nervousness.  Post-concussion symptom scale is 11/21 for symptoms and 24/126 for severity of symptoms.  He denies any eye pain, vision  changes, chest pain, shortness of breath, neck pain above normal baseline, difficulty speaking or swallowing, changes in orientation, limb weakness, or any subsequent syncopal or pre-syncopal events since date of injury.  No nausea or vomiting.  Not taking any meds to treat the symptoms.  No history of significant head injury or concussion.  He does have an ongoing work comp case from a prior Spinal injury in [place] with L4-5- S1 fusion and chronic neck pain.  He reports these prior issues did not affect his ability to perform his current job and are not related to his current symptoms or complaint. \"  Patient states he was feeling some improvement on Thursday and Friday of last week but today when he came in he felt as if his vision was off still and he has been noticing some dizziness infrequently and also headache has been improving.  No significant visual disturbance or loss of consciousness since he was last seen.  He has not been able to go on to work with his restrictions but has been following our guidelines of reducing screen time and straining mentally or physically.    Today is the " patient's second urgent care visit following work-related injury.   Objective Findings: Patient had 20/30 right, 20/30 left, 20/25 both vision bilaterally.  Patient has full range of motion of upper and lower extremities.  Does have some straining with visual tracking on exam.  No nystagmus noted.  Patient does have some slight photosensitivity noted on exam.  Mild bump to the crown of the scalp for the impact point was but no open wound or laceration.  Patient has full range of motion of the neck.  PERRLA.  EOM intact.  Patient is a good historian and no trouble with speaking or neurodeficits appreciated.   Pre-Existing Condition(s):     Assessment:   Condition Improved    Status: Additional Care Required  Permanent Disability:No    Plan:      Diagnostics:      Comments:       Disability Information   Status: Released to Restricted Duty    From:  10/31/2022  Through: 11/7/2022 Restrictions are: Temporary   Physical Restrictions   Sitting:    Standing:    Stooping:    Bending:      Squatting:    Walking:    Climbing:    Pushing:      Pulling:    Other:    Reaching Above Shoulder (L):   Reaching Above Shoulder (R):       Reaching Below Shoulder (L):    Reaching Below Shoulder (R):      Not to exceed Weight Limits   Carrying(hrs):   Weight Limit(lb): < or = to 25 pounds Lifting(hrs):   Weight  Limit(lb): < or = to 25 pounds   Comments: At this time given that patient's symptoms are still persistent although improving I would recommend continuing with restricted duty as outlined at last visit.Continue with previous restrictions of less than 1 hour of screen time, mainly seated work, no operating of heavy machinery or driving.    Repetitive Actions   Hands: i.e. Fine Manipulations from Grasping:     Feet: i.e. Operating Foot Controls:     Driving / Operate Machinery:     Health Care Provider’s Original or Electronic Signature  Primitivo Wallace P.A.-C. Health Care Provider’s Original or Electronic Signature    Sahil Chatterjee  MD         Clinic Name / Location: [facility]  [place] [address] Clinic Phone Number: Dept: [phone]   Appointment Time: 12:30 Pm Visit Start Time: 12:44 PM   Check-In Time:  12:30 Pm Visit Discharge Time:  1:21 PM   Original-Treating Physician or Chiropractor    Page 2-Insurer/TPA    Page 3-Employer    Page 4-Employee

## 2022-10-31 NOTE — PROGRESS NOTES
"Subjective     Manuel Thomason is a 47 y.o. male who presents with Follow-Up (WC follow up, hit Top of head, dizziness and off, cant wear a hat do to the pressure, no driving,)      Initial Visit Copied \" DOI 10/20/22; 1st visit following initial care in the ED.  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  He struck his head on the metal frame of the low doorway and had an immediate syncopal episode that was observed by coworkers.  He was unconscious for less than 30 seconds.  He was transported by ambulance to [place] ED.  ED notes reviewed today and of note, it was not recorded that he struck his head preceeding the syncopal event and he was evaluated as if it were an unprovoked syncope.  He reports in the ED he was treated symptomatically and discharged with instructions to rest.  He did not have a head CT or neck imaging performed. EKG was obtained with nonspecific t-wave changes and he was advised to follow up with cardiology.  However, this follow up was not noted on the C4 form and no referral was placed.  Lab review from ED notes discussed with Manuel.  He was aware of the elevated AST (56) and ALT (103) as he has had high liver enzymes historically.  He did have a high CK, total (924) in the ED but it does not seem to have been addressed.  He denies any muscle cramping, abdominal pain or nausea/vomiting.  He is a weight  and active athlete.  He denies any history of rhabdomyelosis.  BUN was 31.  GFR was 80.  ED discharge noted polythecemia.  However, on review, RBC was marginally elevated at 6.02 with a slightly high HGC and .7 and 54.0 respectively) and likely insignificant variations.  Troponin was normal.  NT-proBNP was 7. Nonfasting glucose 122.  No abnormalities in sodium or potassium.  TSH and free T4 unremarkable.    Today:   Manuel reports feeling persistent post-cocusive symptoms with a dull headache at the top of his head at impact site, mental " "fogginess, dizziness, and fatigue as primary complaints.   He reports feeling off balance, has intermittent tingling in the limbs, and feels more emotional with ireitability, sadness, and nervousness.  Post-concussion symptom scale is 11/21 for symptoms and 24/126 for severity of symptoms.  He denies any eye pain, vision  changes, chest pain, shortness of breath, neck pain above normal baseline, difficulty speaking or swallowing, changes in orientation, limb weakness, or any subsequent syncopal or pre-syncopal events since date of injury.  No nausea or vomiting.  Not taking any meds to treat the symptoms.  No history of significant head injury or concussion.  He does have an ongoing work comp case from a prior Spinal injury in [place] with L4-5- S1 fusion and chronic neck pain.  He reports these prior issues did not affect his ability to perform his current job and are not related to his current symptoms or complaint. \"  Patient states he was feeling some improvement on Thursday and Friday of last week but today when he came in he felt as if his vision was off still and he has been noticing some dizziness infrequently and also headache has been improving.  No significant visual disturbance or loss of consciousness since he was last seen.  He has not been able to go on to work with his restrictions but has been following our guidelines of reducing screen time and straining mentally or physically.    Today is the patient's second urgent care visit following work-related injury.     HPI  Patient presents today for follow-up of work-related injury as described above.  Review of Systems   Constitutional:  Negative for chills and fever.   Eyes:  Positive for blurred vision. Negative for double vision, photophobia, pain, discharge and redness.   Neurological:  Positive for dizziness (improving) and headaches (improving).     PMH: No pertinent past medical history to this problem  MEDS: Medications were reviewed in " "Epic  ALLERGIES: Allergies were reviewed in Epic  SOCHX: Works as a package  at [employer]  FH: No pertinent family history to this problem         Objective     /68   Pulse 76   Temp 36.8 °C (98.2 °F) (Temporal)   Resp 18   Ht 1.88 m (6' 2\")   Wt 112 kg (246 lb)   SpO2 96%   BMI 31.58 kg/m²      Physical Exam  Vitals and nursing note reviewed.   Constitutional:       General: He is not in acute distress.     Appearance: Normal appearance. He is well-developed. He is not ill-appearing or toxic-appearing.   HENT:      Head: Normocephalic and atraumatic.      Right Ear: Hearing normal.      Left Ear: Hearing normal.   Cardiovascular:      Rate and Rhythm: Normal rate.   Pulmonary:      Effort: Pulmonary effort is normal.   Musculoskeletal:      Comments: Normal movement in all 4 extremities   Skin:     General: Skin is warm and dry.   Neurological:      Mental Status: He is alert.      Coordination: Coordination normal.   Psychiatric:         Mood and Affect: Mood normal.       Patient had 20/30 right, 20/30 left, 20/25 both vision bilaterally.  Patient has full range of motion of upper and lower extremities.  Does have some straining with visual tracking on exam.  No nystagmus noted.  Patient does have some slight photosensitivity noted on exam.  Mild bump to the crown of the scalp for the impact point was but no open wound or laceration.  Patient has full range of motion of the neck.  PERRLA.  EOM intact.  Patient is a good historian and no trouble with speaking or neurodeficits appreciated.            Assessment & Plan   1. Post concussive syndrome    2. Closed head injury, subsequent encounter  At this time given that patient's symptoms are still persistent although improving I would recommend continuing with restricted duty as outlined at last visit.Continue with previous restrictions of less than 1 hour of screen time, mainly seated work, no operating of heavy machinery or driving. Drink plenty of " fluids.   Overall symptoms are improving but still having some intermittent postconcussive symptoms and would recommend continue with mostly rest and avoiding any strenuous activity.  Anticipate hopeful discharge in next visit.  No worsening of symptoms and no red flag signs.    Please note that this dictation was created using voice recognition software. I have made every reasonable attempt to correct obvious errors, but I expect that there may be errors of grammar and possibly content that I did not discover before finalizing the note.

## 2022-11-07 ENCOUNTER — OCCUPATIONAL MEDICINE (OUTPATIENT)
Dept: URGENT CARE | Facility: PHYSICIAN GROUP | Age: 47
End: 2022-11-07
Payer: COMMERCIAL

## 2022-11-07 VITALS
BODY MASS INDEX: 30.84 KG/M2 | SYSTOLIC BLOOD PRESSURE: 130 MMHG | HEART RATE: 72 BPM | DIASTOLIC BLOOD PRESSURE: 70 MMHG | RESPIRATION RATE: 20 BRPM | WEIGHT: 248 LBS | OXYGEN SATURATION: 95 % | TEMPERATURE: 97.5 F | HEIGHT: 75 IN

## 2022-11-07 DIAGNOSIS — R42 DIZZINESS: ICD-10-CM

## 2022-11-07 DIAGNOSIS — S06.9X9D: ICD-10-CM

## 2022-11-07 DIAGNOSIS — F07.81 POST CONCUSSIVE SYNDROME: ICD-10-CM

## 2022-11-07 DIAGNOSIS — S09.90XD CLOSED HEAD INJURY, SUBSEQUENT ENCOUNTER: ICD-10-CM

## 2022-11-07 DIAGNOSIS — R51.9 ACUTE INTRACTABLE HEADACHE, UNSPECIFIED HEADACHE TYPE: ICD-10-CM

## 2022-11-07 PROCEDURE — 99213 OFFICE O/P EST LOW 20 MIN: CPT | Performed by: NURSE PRACTITIONER

## 2022-11-07 RX ORDER — DICLOFENAC SODIUM 75 MG/1
75 TABLET, DELAYED RELEASE ORAL 2 TIMES DAILY
Qty: 14 TABLET | Refills: 0 | Status: SHIPPED | OUTPATIENT
Start: 2022-11-07 | End: 2022-11-14

## 2022-11-07 ASSESSMENT — ENCOUNTER SYMPTOMS
CHILLS: 0
FEVER: 0

## 2022-11-07 NOTE — LETTER
"   [facility] [place] [address]  Phone:  [phone] - Fax:  [phone]   [facility] Progress Report and Disability Certification  Date of Service: 11/7/2022   No Show:  No  Date / Time of Next Visit: 11/14/2022   Claim Information   Patient Name: Manuel Thomason  Claim Number:     Employer: [employer]  Date of Injury: 10/20/2022     Insurer / TPA: Ailyn [place]  ID / SSN:     Occupation: Package   Diagnosis: Diagnoses of Head injury, acute, with loss of consciousness, subsequent encounter, Post concussive syndrome, Dizziness, Acute intractable headache, unspecified headache type, and Closed head injury, subsequent encounter were pertinent to this visit.    Medical Information   Related to Industrial Injury? Yes    Subjective Complaints:  DOI 10/20/22; 3rd visit following initial care in the ED.  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  He struck his head on the metal frame of the low doorway and had an immediate syncopal episode that was observed by coworkers.  He was unconscious for less than 30 seconds.  He was transported by ambulance to [facility].  ED notes reviewed today and of note, it was not recorded that he struck his head preceeding the syncopal event and he was evaluated as if it were an unprovoked syncope.  He reports in the ED he was treated symptomatically and discharged with instructions to rest.  He did not have a head CT or neck imaging performed. EKG was obtained with nonspecific t-wave changes and he was advised to follow up with cardiology.  However, this follow up was not noted on the C4 form and no referral was placed.  Lab review from ED notes discussed with Manuel.  He was aware of the elevated AST (56) and ALT (103) as he has had high liver enzymes historically.  He did have a high CK, total (924) in the ED but it does not seem to " "have been addressed.  He denies any muscle cramping, abdominal pain or nausea/vomiting.  He is a weight  and active athlete.  He denies any history of rhabdomyelosis.  BUN was 31.  GFR was 80.  ED discharge noted polythecemia.  However, on review, RBC was marginally elevated at 6.02 with a slightly high HGC and .7 and 54.0 respectively) and likely insignificant variations.  Troponin was normal.  NT-proBNP was 7. Nonfasting glucose 122.  No abnormalities in sodium or potassium.  TSH and free T4 unremarkable.    1st visit:  Manuel reports feeling persistent post-cocusive symptoms with a dull headache at the top of his head at impact site, mental fogginess, dizziness, and fatigue as primary complaints.   He reports feeling off balance, has intermittent tingling in the limbs, and feels more emotional with ireitability, sadness, and nervousness.  Post-concussion symptom scale is 11/21 for symptoms and 24/126 for severity of symptoms.  He denies any eye pain, vision  changes, chest pain, shortness of breath, neck pain above normal baseline, difficulty speaking or swallowing, changes in orientation, limb weakness, or any subsequent syncopal or pre-syncopal events since date of injury.  No nausea or vomiting.  Not taking any meds to treat the symptoms.  No history of significant head injury or concussion.  He does have an ongoing work comp case from a prior Spinal injury in [place] with L4-5- S1 fusion and chronic neck pain.  He reports these prior issues did not affect his ability to perform his current job and are not related to his current symptoms or complaint. \"  Patient states he was feeling some improvement on Thursday and Friday of last week but today when he came in he felt as if his vision was off still and he has been noticing some dizziness infrequently and also headache has been improving.  No significant visual disturbance or loss of consciousness since he was last seen.  He has not been able to " "go on to work with his restrictions but has been following our guidelines of reducing screen time and straining mentally or physically.\"   He was evaluated last week and was trending better but not resolved.  His work restrictions were continued.  Today he returns to clinic again feeling poorly with persistent daily headache and blurry vision.  When he tries to focus he feels dizzy.  He is taking ibuprofen with no relief.  Impact concussion symptom and severity score today: symptoms 9/23; severity 25/126 indicating minimal improvement since first visit.  He denies any new symptoms.     Objective Findings: Manuel is alert, oriented, and in no acute distress.  GCS 15.  Head: TTP at the crown at impact site with low metal door frame, improved since last examined.  No contusion or open wound.  PERRL.  Mild photosensitivity noted.  Vision: right 20/70; left 20/50; both eyes 20/30.  Speech is regular.  Good historian.  Limb NV, sensation and strength intact.  No gait abnormalities.  No neck TTP.  Neck ROM grossly intact.  Heart RRR, S1, S2.  Lungs clear to auscultation.     Pre-Existing Condition(s):     Assessment:   Condition Same    Status: Additional Care Required  Comments:Follow up at occupational medicine in 1 week, or to ED sooner if worse.  Permanent Disability:No    Plan: MedicationPT  Comments:Diclofenac 75 mg BID prn headache.  Vestibular physical therapy consult and participation ordered.  Head CT without contrast ordered.    Diagnostics:      Comments:       Disability Information   Status: Released to Restricted Duty    From:  11/7/2022  Through: 11/14/2022 Restrictions are: Temporary   Physical Restrictions   Sitting:    Standing:    Stooping:    Bending:      Squatting:    Walking:    Climbing:    Pushing:      Pulling:    Other:    Reaching Above Shoulder (L):   Reaching Above Shoulder (R):       Reaching Below Shoulder (L):    Reaching Below Shoulder (R):      Not to exceed Weight Limits   Carrying(hrs): "   Weight Limit(lb):   Lifting(hrs):   Weight  Limit(lb):     Comments: Post concussive protocol.  Limit physically and mentally strenuous tasks.  No performing safety sensitive tasks such as driving or operating machinery.  Limit screen time to 1 hour per day.  Primarily seated work.    Follow up at occupational medicine in 1 week.  Head CT and vestibular physical therapy ordered.    Consulted Dr. Tien Nicholson, occupational medicine regarding this case; he concurs with plan of care.     Repetitive Actions   Hands: i.e. Fine Manipulations from Grasping:     Feet: i.e. Operating Foot Controls:     Driving / Operate Machinery: 0 hrs/day   Health Care Provider’s Original or Electronic Signature  CLARA Perkins. Health Care Provider’s Original or Electronic Signature    Sahil Chatterjee MD         Clinic Name / Location: [address] [address] Clinic Phone Number: Dept: [phone]   Appointment Time: 12:30 Pm Visit Start Time: 1:12 PM   Check-In Time:  12:15 Pm Visit Discharge Time:  2:07 PM   Original-Treating Physician or Chiropractor    Page 2-Insurer/TPA    Page 3-Employer    Page 4-Employee

## 2022-11-08 NOTE — PROGRESS NOTES
"Subjective     Manuel Thomason is a 47 y.o. male who presents with Follow-Up (WC /Headache, vision blurry x 1 week )      DOI 10/20/22; 3rd visit following initial care in the ED.  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  He struck his head on the metal frame of the low doorway and had an immediate syncopal episode that was observed by coworkers.  He was unconscious for less than 30 seconds.  He was transported by ambulance to [facility].  ED notes reviewed today and of note, it was not recorded that he struck his head preceeding the syncopal event and he was evaluated as if it were an unprovoked syncope.  He reports in the ED he was treated symptomatically and discharged with instructions to rest.  He did not have a head CT or neck imaging performed. EKG was obtained with nonspecific t-wave changes and he was advised to follow up with cardiology.  However, this follow up was not noted on the C4 form and no referral was placed.  Lab review from ED notes discussed with Manuel.  He was aware of the elevated AST (56) and ALT (103) as he has had high liver enzymes historically.  He did have a high CK, total (924) in the ED but it does not seem to have been addressed.  He denies any muscle cramping, abdominal pain or nausea/vomiting.  He is a weight  and active athlete.  He denies any history of rhabdomyelosis.  BUN was 31.  GFR was 80.  ED discharge noted polythecemia.  However, on review, RBC was marginally elevated at 6.02 with a slightly high HGC and .7 and 54.0 respectively) and likely insignificant variations.  Troponin was normal.  NT-proBNP was 7. Nonfasting glucose 122.  No abnormalities in sodium or potassium.  TSH and free T4 unremarkable.    1st visit:  Manuel reports feeling persistent post-cocusive symptoms with a dull headache at the top of his head at impact site, mental fogginess, dizziness, and fatigue as " "primary complaints.   He reports feeling off balance, has intermittent tingling in the limbs, and feels more emotional with ireitability, sadness, and nervousness.  Post-concussion symptom scale is 11/21 for symptoms and 24/126 for severity of symptoms.  He denies any eye pain, vision  changes, chest pain, shortness of breath, neck pain above normal baseline, difficulty speaking or swallowing, changes in orientation, limb weakness, or any subsequent syncopal or pre-syncopal events since date of injury.  No nausea or vomiting.  Not taking any meds to treat the symptoms.  No history of significant head injury or concussion.  He does have an ongoing work comp case from a prior Spinal injury in [place] with L4-5- S1 fusion and chronic neck pain.  He reports these prior issues did not affect his ability to perform his current job and are not related to his current symptoms or complaint. \"  Patient states he was feeling some improvement on Thursday and Friday of last week but today when he came in he felt as if his vision was off still and he has been noticing some dizziness infrequently and also headache has been improving.  No significant visual disturbance or loss of consciousness since he was last seen.  He has not been able to go on to work with his restrictions but has been following our guidelines of reducing screen time and straining mentally or physically.\"   He was evaluated last week and was trending better but not resolved.  His work restrictions were continued.  Today he returns to clinic again feeling poorly with persistent daily headache and blurry vision.  When he tries to focus he feels dizzy.  He is taking ibuprofen with no relief.  Impact concussion symptom and severity score today: symptoms 9/23; severity 25/126 indicating minimal improvement since first visit.  He denies any new symptoms.       HPI    Review of Systems   Constitutional:  Positive for malaise/fatigue. Negative for chills and fever. " "     Medications, Allergies, and current problem list reviewed today in Epic      Objective     Blood Pressure 130/70   Pulse 72   Temperature 36.4 °C (97.5 °F) (Temporal)   Respiration 20   Height 1.905 m (6' 3\")   Weight 112 kg (248 lb)   Oxygen Saturation 95%   Body Mass Index 31.00 kg/m²      Physical Exam    Manuel is alert, oriented, and in no acute distress.  GCS 15.  Head: TTP at the crown at impact site with low metal door frame, improved since last examined.  No contusion or open wound.  PERRL.  Mild photosensitivity noted.  Vision: right 20/70; left 20/50; both eyes 20/30.  Speech is regular.  Good historian.  Limb NV, sensation and strength intact.  No gait abnormalities.  No neck TTP.  Neck ROM grossly intact.  Heart RRR, S1, S2.  Lungs clear to auscultation.                     Assessment & Plan        1. Head injury, acute, with loss of consciousness, subsequent encounter  Referral to Physical Therapy    CT-HEAD W/O      2. Post concussive syndrome  Referral to Physical Therapy    CT-HEAD W/O      3. Dizziness  Referral to Physical Therapy    CT-HEAD W/O      4. Acute intractable headache, unspecified headache type  CT-HEAD W/O    diclofenac  (VOLTAREN) 75 MG Tablet Delayed Response      5. Closed head injury, subsequent encounter          Discussed exam findings with Manuel. Differential reviewed.  Symptoms consistent with post concussive syndrome.  Work restrictions per D[employer].  Diclofenac as prescribed.    Head CT ordered.  Referred to PT for vestibular therapy.  Follow up with occ med in 1 week.  ED precautions reviewed.  Discussed case with Dr. Tien Nicholson.  He concurs with plan of care.  Manuel verbalized understanding of and agreed with plan of care.                  "

## 2022-11-14 ENCOUNTER — OCCUPATIONAL MEDICINE (OUTPATIENT)
Dept: OCCUPATIONAL MEDICINE | Facility: CLINIC | Age: 47
End: 2022-11-14
Payer: COMMERCIAL

## 2022-11-14 DIAGNOSIS — F07.81 POSTCONCUSSIVE SYNDROME: ICD-10-CM

## 2022-11-14 PROCEDURE — 99203 OFFICE O/P NEW LOW 30 MIN: CPT | Performed by: PREVENTIVE MEDICINE

## 2022-11-14 NOTE — LETTER
[address],   [address] [place] [address]  Phone:  [phone] - Fax:  [phone]   Occupational Health [facility] Progress Report and Disability Certification  Date of Service: 11/14/2022   No Show:  No  Date / Time of Next Visit: 12/5/2022   Claim Information   Patient Name: Manuel Thomason  Claim Number:     Employer: [employer]  Date of Injury: 10/20/2022     Insurer / TPA: Ailyn [place]  ID / SSN:     Occupation: Package   Diagnosis: The encounter diagnosis was Postconcussive syndrome.    Medical Information   Related to Industrial Injury? 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  He struck his head on the metal frame of the low doorway and had an immediate syncopal episode that was observed by coworkers.  He was seen at the [facility] and urgent care.  Extensive lab work was done at [facility] which were unremarkable except for slightly elevated CK.  No imaging of the head and neck was performed.  Patient states that his most prevalent symptoms are headache and dizziness.  He states the headache occurs frequently, not really relieved with ibuprofen.  He was prescribed diclofenac at the urgent care and just picked that up.  He states that the other bothersome symptom is the dizziness.  He states that he is frequently dizzy when he tries to focus on something.  He states that the 2 vision tests done in urgent care elicited significant dizziness.  Denies dizziness with head position.  Denies any increase in neck pain.  Off of work due to restrictions.   Objective Findings: Neuro: Alert and oriented x3.  Cranial nerves grossly intact.  Coordination intact.  Romberg negative.  HEENT: PERRLA.  EOMI.  No nystagmus or saccades.  However visual motor testing elicited significant dizziness.  No dizziness with [place]-Hallpike maneuvers.    Pre-Existing Condition(s):     Assessment:   Condition Same    Status: Additional Care Required  Permanent Disability:No    Plan:      Diagnostics:      Comments:  Recommend vestibular therapy, placed referral  Referral for CT head without, pending approval  Diclofenac 75 mg twice daily as needed, may also take Tylenol 1000 mg 3 times daily as needed  Restricted duty  Follow-up 3 weeks    Disability Information   Status: Released to Restricted Duty    From:  11/14/2022  Through: 12/5/2022 Restrictions are: Temporary   Physical Restrictions   Sitting:    Standing:    Stooping:    Bending:      Squatting:    Walking:    Climbing:    Pushing:      Pulling:    Other:    Reaching Above Shoulder (L):   Reaching Above Shoulder (R):       Reaching Below Shoulder (L):    Reaching Below Shoulder (R):      Not to exceed Weight Limits   Carrying(hrs):   Weight Limit(lb):   Lifting(hrs):   Weight  Limit(lb):     Comments: Post concussive protocol.  Limit physically and mentally strenuous tasks.  No performing safety sensitive tasks such as driving or operating machinery.  Limit screen time to 1 hour per day.  Primarily seated work.      Repetitive Actions   Hands: i.e. Fine Manipulations from Grasping:     Feet: i.e. Operating Foot Controls:     Driving / Operate Machinery: 0 hrs/day   Health Care Provider’s Original or Electronic Signature  Tien Nicholson D.O. Health Care Provider’s Original or Electronic Signature    Tien Nicholson DO MPH     Clinic Name / Location: [address],   [address] [place] [address] Clinic Phone Number: Dept: [phone]   Appointment Time: 1:30 Pm Visit Start Time: 1:19 PM   Check-In Time:  1:17 Pm Visit Discharge Time:  1:58pm   Original-Treating Physician or Chiropractor    Page 2-Insurer/TPA    Page 3-Employer    Page 4-Employee

## 2022-11-14 NOTE — PROGRESS NOTES
Subjective:     Manuel Thomason is a 47 y.o. 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  He struck his head on the metal frame of the low doorway and had an immediate syncopal episode that was observed by coworkers.  He was seen at the [facility] and urgent care.  Extensive lab work was done at [facility] which were unremarkable except for slightly elevated CK.  No imaging of the head and neck was performed.  Patient states that his most prevalent symptoms are headache and dizziness.  He states the headache occurs frequently, not really relieved with ibuprofen.  He was prescribed diclofenac at the urgent care and just picked that up.  He states that the other bothersome symptom is the dizziness.  He states that he is frequently dizzy when he tries to focus on something.  He states that the 2 vision tests done in urgent care elicited significant dizziness.  Denies dizziness with head position.  Denies any increase in neck pain.  Off of work due to restrictions.    ROS: All systems were reviewed on intake form, form was reviewed and signed. See scanned documents in media. Pertinent positives and negatives included in HPI.    PMH: No pertinent past medical history to this problem  MEDS: Medications were reviewed in Epic  ALLERGIES:   Allergies   Allergen Reactions    Wellbutrin [Bupropion]      SOCHX: Works as a package  at [employer]  FH: No pertinent family history to this problem       Objective:     There were no vitals taken for this visit.    Constitutional: Patient is in no acute distress. Appears well-developed and well-nourished.   HENT: Normocephalic and atraumatic. EOM are normal. No scleral icterus.   Cardiovascular: Normal rate.    Pulmonary/Chest: Effort normal. No respiratory distress.   Neurological: Patient is alert and oriented to person, place, and  time.   Skin: Skin is warm and dry.   Psychiatric: Normal mood and affect. Behavior is normal.     Neuro: Alert and oriented x3.  Cranial nerves grossly intact.  Coordination intact.  Romberg negative.  HEENT: PERRLA.  EOMI.  No nystagmus or saccades.  However visual motor testing elicited significant dizziness.  No dizziness with [place]-Hallpike maneuvers.    Assessment/Plan:       1. Postconcussive syndrome  - Referral to Radiology    Released to Restricted Duty FROM 11/14/2022 TO 12/5/2022  Post concussive protocol.  Limit physically and mentally strenuous tasks.  No performing safety sensitive tasks such as driving or operating machinery.  Limit screen time to 1 hour per day.  Primarily seated work.    Recommend vestibular therapy, placed referral  Referral for CT head without, pending approval  Diclofenac 75 mg twice daily as needed, may also take Tylenol 1000 mg 3 times daily as needed  Restricted duty  Follow-up 3 weeks    Differential diagnosis, natural history, supportive care, and indications for immediate follow-up discussed.    Approximately 35 minutes were spent in reviewing notes, preparing for visit, obtaining history, exam and evaluation, patient counseling/education and post visit documentation/orders.

## 2022-12-05 ENCOUNTER — OCCUPATIONAL MEDICINE (OUTPATIENT)
Dept: OCCUPATIONAL MEDICINE | Facility: CLINIC | Age: 47
End: 2022-12-05
Payer: COMMERCIAL

## 2022-12-05 VITALS
WEIGHT: 248 LBS | DIASTOLIC BLOOD PRESSURE: 78 MMHG | RESPIRATION RATE: 18 BRPM | HEIGHT: 75 IN | HEART RATE: 63 BPM | SYSTOLIC BLOOD PRESSURE: 126 MMHG | TEMPERATURE: 98 F | OXYGEN SATURATION: 95 % | BODY MASS INDEX: 30.84 KG/M2

## 2022-12-05 DIAGNOSIS — F07.81 POSTCONCUSSIVE SYNDROME: ICD-10-CM

## 2022-12-05 PROCEDURE — 99213 OFFICE O/P EST LOW 20 MIN: CPT | Performed by: PREVENTIVE MEDICINE

## 2022-12-05 ASSESSMENT — ENCOUNTER SYMPTOMS
HEADACHES: 1
TINGLING: 1
DIZZINESS: 1
PHOTOPHOBIA: 1

## 2022-12-05 NOTE — PROGRESS NOTES
"Subjective:     Manuel Thomason is a 47 y.o. 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  He struck his head on the metal frame of the low doorway and had an immediate syncopal episode that was observed by coworkers.  Patient states overall symptoms are about the same.  Continues to get headaches, dizziness, nausea.  He also notes ringing in the ears especially the left side.  He states it was so bad at 1 point that he could barely hear out of the left ear.  He has yet to hear anything from work comp insurance approving vestibular therapy or CT scan.  Taking Tylenol 1000 mg for relief which helps a little bit.    Review of Systems   Constitutional:  Positive for malaise/fatigue.   HENT:  Positive for hearing loss and tinnitus.    Eyes:  Positive for photophobia.   Neurological:  Positive for dizziness, tingling and headaches.     SOCHX: Works as a  at [employer]  FH: No pertinent family history to this problem.       Objective:     /78   Pulse 63   Temp 36.7 °C (98 °F)   Resp 18   Ht 1.905 m (6' 3\")   Wt 112 kg (248 lb)   SpO2 95%   BMI 31.00 kg/m²     Constitutional: Patient is in no acute distress. Appears well-developed and well-nourished.   Cardiovascular: Normal rate.    Pulmonary/Chest: Effort normal. No respiratory distress.   Neurological: Patient is alert and oriented to person, place, and time.   Skin: Skin is warm and dry.   Psychiatric: Normal mood and affect. Behavior is normal.     Neuro: Alert and oriented x3.  Cranial nerves grossly intact.  Coordination intact.  Romberg negative.  HEENT: PERRLA.  EOMI.  No nystagmus or saccades.  However visual motor testing elicited significant dizziness.  No dizziness with [place]-Hallpike maneuvers.    Assessment/Plan:       1. Postconcussive syndrome    Released to Restricted " Duty FROM 12/5/2022 TO 1/4/2023  Post concussive protocol.  Limit physically and mentally strenuous tasks.  No performing safety sensitive tasks such as driving or operating machinery.  Limit screen time to 1 hour per day.  Primarily seated work.     Recommend vestibular therapy, pending approval  Referral for CT head without, pending approval  Ibuprofen 800 mg 3 times daily May take with Tylenol 1000 mg 3 times daily as needed  Restricted duty  Follow-up 3 weeks    Differential diagnosis, natural history, supportive care, and indications for immediate follow-up discussed.    Approximately 25 minutes was spent in preparing for visit, obtaining history, exam and evaluation, patient counseling/education and post visit documentation/orders.    ADDENDUM: Patient called with significantly worsening tinnitus. Placed ENT referral.

## 2022-12-05 NOTE — LETTER
[address],   [address] [place] [address]  Phone:  [phone] - Fax:  [phone]   [facility] Progress Report and Disability Certification  Date of Service: 12/5/2022   No Show:  No  Date / Time of Next Visit: 1/4/2023@1:15pm   Claim Information   Patient Name: Manuel Thomason  Claim Number:     Employer: [employer]  Date of Injury: 10/20/2022     Insurer / TPA: Ailyn [place]  ID / SSN:     Occupation: Package   Diagnosis: The encounter diagnosis was Postconcussive syndrome.    Medical Information   Related to Industrial Injury? 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  He struck his head on the metal frame of the low doorway and had an immediate syncopal episode that was observed by coworkers.  Patient states overall symptoms are about the same.  Continues to get headaches, dizziness, nausea.  He also notes ringing in the ears especially the left side.  He states it was so bad at 1 point that he could barely hear out of the left ear.  He has yet to hear anything from work comp insurance approving vestibular therapy or CT scan.  Taking Tylenol 1000 mg for relief which helps a little bit.   Objective Findings: Neuro: Alert and oriented x3.  Cranial nerves grossly intact.  Coordination intact.  Romberg negative.  HEENT: PERRLA.  EOMI.  No nystagmus or saccades.  However visual motor testing elicited significant dizziness.  No dizziness with [place]-Hallpike maneuvers.   Pre-Existing Condition(s):     Assessment:   Condition Same    Status: Additional Care Required  Permanent Disability:No    Plan:      Diagnostics:      Comments:  Recommend vestibular therapy, pending approval  Referral for CT head without, pending approval  Ibuprofen 800 mg 3 times daily May take with Tylenol 1000 mg 3 times daily as  needed  Restricted duty  Follow-up 3 weeks    Disability Information   Status: Released to Restricted Duty    From:  12/5/2022  Through: 1/4/2023 Restrictions are: Temporary   Physical Restrictions   Sitting:    Standing:    Stooping:    Bending:      Squatting:    Walking:    Climbing:    Pushing:      Pulling:    Other:    Reaching Above Shoulder (L):   Reaching Above Shoulder (R):       Reaching Below Shoulder (L):    Reaching Below Shoulder (R):      Not to exceed Weight Limits   Carrying(hrs):   Weight Limit(lb):   Lifting(hrs):   Weight  Limit(lb):     Comments: Post concussive protocol.  Limit physically and mentally strenuous tasks.  No performing safety sensitive tasks such as driving or operating machinery.  Limit screen time to 1 hour per day.  Primarily seated work.     Repetitive Actions   Hands: i.e. Fine Manipulations from Grasping:     Feet: i.e. Operating Foot Controls:     Driving / Operate Machinery:     Health Care Provider’s Original or Electronic Signature  Tien Nicholson D.O. Health Care Provider’s Original or Electronic Signature    Tien Nicholson DO MPH     Clinic Name / Location: [address] [address] Clinic Phone Number: Dept: [phone]   Appointment Time: 1:00 Pm Visit Start Time: 12:59 PM   Check-In Time:  12:53 Pm Visit Discharge Time:  1:25PM   Original-Treating Physician or Chiropractor    Page 2-Insurer/TPA    Page 3-Employer    Page 4-Employee

## 2022-12-22 ENCOUNTER — HOSPITAL ENCOUNTER (OUTPATIENT)
Dept: LAB | Facility: MEDICAL CENTER | Age: 47
End: 2022-12-22
Attending: UROLOGY

## 2022-12-22 LAB
HCT VFR BLD AUTO: 48.9 % (ref 42–52)
HGB BLD-MCNC: 17 G/DL (ref 14–18)

## 2022-12-22 PROCEDURE — 84403 ASSAY OF TOTAL TESTOSTERONE: CPT

## 2022-12-22 PROCEDURE — 82681 ASSAY DIR MEAS FR ESTRADIOL: CPT

## 2022-12-22 PROCEDURE — 84450 TRANSFERASE (AST) (SGOT): CPT

## 2022-12-22 PROCEDURE — 85014 HEMATOCRIT: CPT

## 2022-12-22 PROCEDURE — 85018 HEMOGLOBIN: CPT

## 2022-12-22 PROCEDURE — 84146 ASSAY OF PROLACTIN: CPT

## 2022-12-22 PROCEDURE — 84402 ASSAY OF FREE TESTOSTERONE: CPT

## 2022-12-22 PROCEDURE — 369999 HCHG MISC LAB CHARGE

## 2022-12-22 PROCEDURE — 36415 COLL VENOUS BLD VENIPUNCTURE: CPT

## 2022-12-22 PROCEDURE — 84153 ASSAY OF PSA TOTAL: CPT

## 2022-12-22 PROCEDURE — 84270 ASSAY OF SEX HORMONE GLOBUL: CPT

## 2022-12-22 PROCEDURE — 84436 ASSAY OF TOTAL THYROXINE: CPT

## 2022-12-22 PROCEDURE — 82642 DIHYDROTESTOSTERONE: CPT

## 2022-12-22 PROCEDURE — 84305 ASSAY OF SOMATOMEDIN: CPT

## 2022-12-22 PROCEDURE — 83002 ASSAY OF GONADOTROPIN (LH): CPT

## 2022-12-22 PROCEDURE — 84154 ASSAY OF PSA FREE: CPT

## 2022-12-22 PROCEDURE — 83001 ASSAY OF GONADOTROPIN (FSH): CPT

## 2022-12-22 PROCEDURE — 84460 ALANINE AMINO (ALT) (SGPT): CPT

## 2022-12-22 PROCEDURE — 84443 ASSAY THYROID STIM HORMONE: CPT

## 2022-12-22 PROCEDURE — 84075 ASSAY ALKALINE PHOSPHATASE: CPT

## 2022-12-22 PROCEDURE — 82670 ASSAY OF TOTAL ESTRADIOL: CPT

## 2022-12-22 PROCEDURE — 84480 ASSAY TRIIODOTHYRONINE (T3): CPT

## 2022-12-23 LAB
ALP SERPL-CCNC: 69 U/L (ref 30–99)
ALT SERPL-CCNC: 56 U/L (ref 2–50)
AST SERPL-CCNC: 58 U/L (ref 12–45)
ESTRADIOL SERPL-MCNC: 7.1 PG/ML
FSH SERPL-ACNC: 3.7 MIU/ML (ref 1.5–12.4)
LH SERPL-ACNC: 1.8 IU/L (ref 1.7–8.6)
PROLACTIN SERPL-MCNC: 9.28 NG/ML (ref 2.1–17.7)
T3 SERPL-MCNC: 100 NG/DL (ref 60–181)
T4 SERPL-MCNC: 5.8 UG/DL (ref 4–12)
TSH SERPL DL<=0.005 MIU/L-ACNC: 3.66 UIU/ML (ref 0.38–5.33)

## 2022-12-24 LAB
IGF-I SERPL-MCNC: 172 NG/ML (ref 74–227)
IGF-I Z-SCORE SERPL: 0.7
PSA FREE MFR SERPL: 60 %
PSA FREE SERPL-MCNC: 0.3 NG/ML
PSA SERPL-MCNC: 0.5 NG/ML (ref 0–4)
SHBG SERPL-SCNC: 34 NMOL/L (ref 17–56)
TESTOST FREE MFR SERPL: 1.7 % (ref 1.6–2.9)
TESTOST FREE SERPL-MCNC: 31 PG/ML (ref 47–244)
TESTOST SERPL-MCNC: 182 NG/DL (ref 300–890)

## 2022-12-25 LAB — ANDROSTANOLONE SERPL-MCNC: 204.1 PG/ML (ref 106–719)

## 2022-12-29 LAB — MISCELLANEOUS LAB RESULT MISCLAB: NORMAL

## 2023-01-04 ENCOUNTER — OCCUPATIONAL MEDICINE (OUTPATIENT)
Dept: OCCUPATIONAL MEDICINE | Facility: CLINIC | Age: 48
End: 2023-01-04
Payer: COMMERCIAL

## 2023-01-04 VITALS
BODY MASS INDEX: 30.84 KG/M2 | HEIGHT: 75 IN | WEIGHT: 248 LBS | HEART RATE: 68 BPM | DIASTOLIC BLOOD PRESSURE: 82 MMHG | SYSTOLIC BLOOD PRESSURE: 128 MMHG | OXYGEN SATURATION: 98 % | RESPIRATION RATE: 18 BRPM

## 2023-01-04 DIAGNOSIS — F07.81 POSTCONCUSSIVE SYNDROME: ICD-10-CM

## 2023-01-04 PROCEDURE — 99213 OFFICE O/P EST LOW 20 MIN: CPT | Performed by: PREVENTIVE MEDICINE

## 2023-01-04 ASSESSMENT — ENCOUNTER SYMPTOMS
DIZZINESS: 1
HEADACHES: 1
NAUSEA: 0
DEPRESSION: 0
NERVOUS/ANXIOUS: 0
VOMITING: 0
PHOTOPHOBIA: 1

## 2023-01-04 NOTE — LETTER
[address],   [address] [place] [address]  Phone:  [phone] - Fax:  [phone]   [facility] Progress Report and Disability Certification  Date of Service: 1/4/2023   No Show:  No  Date / Time of Next Visit: 1/25/2023   Claim Information   Patient Name: Manuel Thomason  Claim Number:     Employer: [employer]  Date of Injury: 10/20/2022     Insurer / TPA: Ailyn [place]  ID / SSN:     Occupation: Package   Diagnosis: The encounter diagnosis was Postconcussive syndrome.    Medical Information   Related to Industrial Injury? 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  He struck his head on the metal frame of the low doorway and had an immediate syncopal episode that was observed by coworkers.  Patient states that his claim is being litigated currently.  He states that he has a  and they are going to court on January 17.  He states otherwise symptoms about the same.  He gets headaches several times per week.  They are relieved with ibuprofen and Tylenol.  He continues to have left-sided hearing loss.  He also notes continued dizziness.   Objective Findings: Neuro: Alert and oriented x3.  Cranial nerves grossly intact.  Coordination intact.  Romberg negative.  HEENT: PERRLA.  EOMI.     Pre-Existing Condition(s):     Assessment:   Condition Same    Status: Additional Care Required  Permanent Disability:No    Plan:      Diagnostics:      Comments:  Recommend vestibular therapy, pending approval  Referral for CT head without, pending approval  Referral for ENT, pending approval  Ibuprofen 800 mg 3 times daily May take with Tylenol 1000 mg 3 times daily as needed  Restricted duty  Follow-up 3 weeks    Disability Information   Status: Released to Restricted Duty    From:  1/4/2023  Through: 1/25/2023 Restrictions are:  Temporary   Physical Restrictions   Sitting:    Standing:    Stooping:    Bending:      Squatting:    Walking:    Climbing:    Pushing:      Pulling:    Other:    Reaching Above Shoulder (L):   Reaching Above Shoulder (R):       Reaching Below Shoulder (L):    Reaching Below Shoulder (R):      Not to exceed Weight Limits   Carrying(hrs):   Weight Limit(lb): < or = to 25 pounds Lifting(hrs):   Weight  Limit(lb): < or = to 25 pounds   Comments: No performing safety sensitive tasks such as driving or operating machinery.  Limit screen time to 1 hour per day.  Primarily seated work.      Repetitive Actions   Hands: i.e. Fine Manipulations from Grasping:     Feet: i.e. Operating Foot Controls:     Driving / Operate Machinery:     Health Care Provider’s Original or Electronic Signature  Tien Nicholson D.O. Health Care Provider’s Original or Electronic Signature    Tien Nicholson DO MPH     Clinic Name / Location: [address],   [address] [place] [address] Clinic Phone Number: Dept: [phone]   Appointment Time: 1:15 Pm Visit Start Time: 1:12 PM   Check-In Time:  1:07 Pm Visit Discharge Time: 2:45 Pm    Original-Treating Physician or Chiropractor    Page 2-Insurer/TPA    Page 3-Employer    Page 4-Employee

## 2023-01-04 NOTE — PROGRESS NOTES
"Subjective:     Manuel Thomason is a 47 y.o. 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  He struck his head on the metal frame of the low doorway and had an immediate syncopal episode that was observed by coworkers.  Patient states that his claim is being litigated currently.  He states that he has a  and they are going to court on January 17.  He states otherwise symptoms about the same.  He gets headaches several times per week.  They are relieved with ibuprofen and Tylenol.  He continues to have left-sided hearing loss.  He also notes continued dizziness.    Review of Systems   Constitutional:  Negative for malaise/fatigue.   HENT:  Positive for hearing loss.    Eyes:  Positive for photophobia.   Gastrointestinal:  Negative for nausea and vomiting.   Neurological:  Positive for dizziness and headaches.   Psychiatric/Behavioral:  Negative for depression. The patient is not nervous/anxious.      SOCHX: Works as a  at [employer]  FH: No pertinent family history to this problem.       Objective:     /82   Pulse 68   Resp 18   Ht 1.905 m (6' 3\")   Wt 112 kg (248 lb)   SpO2 98%   BMI 31.00 kg/m²     Constitutional: Patient is in no acute distress. Appears well-developed and well-nourished.   Cardiovascular: Normal rate.    Pulmonary/Chest: Effort normal. No respiratory distress.   Neurological: Patient is alert and oriented to person, place, and time.   Skin: Skin is warm and dry.   Psychiatric: Normal mood and affect. Behavior is normal.     Neuro: Alert and oriented x3.  Cranial nerves grossly intact.  Coordination intact.  Romberg negative.  HEENT: JONNY.  EOMI.      Assessment/Plan:       1. Postconcussive syndrome    Released to Restricted Duty FROM 1/4/2023 TO 1/25/2023  No performing safety sensitive tasks such as " driving or operating machinery.  Limit screen time to 1 hour per day.  Primarily seated work.      Recommend vestibular therapy, pending approval  Referral for CT head without, pending approval  Referral for ENT, pending approval  Ibuprofen 800 mg 3 times daily May take with Tylenol 1000 mg 3 times daily as needed  Restricted duty  Follow-up 3 weeks    Differential diagnosis, natural history, supportive care, and indications for immediate follow-up discussed.    Approximately 25 minutes was spent in preparing for visit, obtaining history, exam and evaluation, patient counseling/education and post visit documentation/orders.

## 2023-01-04 NOTE — LETTER
[address],   [address] [place] [address]  Phone:  [phone] - Fax:  [phone]   [facility] Progress [facility] Disability Certification  Date of Service: 1/4/2023   No Show:  No  Date / Time of Next Visit: 1/25/2023@11:15AM   Claim Information   Patient Name: Manuel Thomason  Claim Number:     Employer: [employer]  Date of Injury: 10/20/2022     Insurer / TPA: Ailyn [place]  ID / SSN:     Occupation: Package   Diagnosis: The encounter diagnosis was Postconcussive syndrome.    Medical Information   Related to Industrial Injury? 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  He struck his head on the metal frame of the low doorway and had an immediate syncopal episode that was observed by coworkers.  Patient states that his claim is being litigated currently.  He states that he has a  and they are going to court on January 17.  He states otherwise symptoms about the same.  He gets headaches several times per week.  They are relieved with ibuprofen and Tylenol.  He continues to have left-sided hearing loss.  He also notes continued dizziness.   Objective Findings: Neuro: Alert and oriented x3.  Cranial nerves grossly intact.  Coordination intact.  Romberg negative.  HEENT: PERRLA.  EOMI.     Pre-Existing Condition(s):     Assessment:   Condition Same    Status: Additional Care Required  Permanent Disability:No    Plan:      Diagnostics:      Comments:  Recommend vestibular therapy, pending approval  Referral for CT head without, pending approval  Referral for ENT, pending approval  Ibuprofen 800 mg 3 times daily May take with Tylenol 1000 mg 3 times daily as needed  Restricted duty  Follow-up 3 weeks    Disability Information   Status: Released to Restricted Duty    From:  1/4/2023  Through: 1/25/2023  Restrictions are: Temporary   Physical Restrictions   Sitting:    Standing:    Stooping:    Bending:      Squatting:    Walking:    Climbing:    Pushing:      Pulling:    Other:    Reaching Above Shoulder (L):   Reaching Above Shoulder (R):       Reaching Below Shoulder (L):    Reaching Below Shoulder (R):      Not to exceed Weight Limits   Carrying(hrs):   Weight Limit(lb):   Lifting(hrs):   Weight  Limit(lb):     Comments: No performing safety sensitive tasks such as driving or operating machinery.  Limit screen time to 1 hour per day.  Primarily seated work.      Repetitive Actions   Hands: i.e. Fine Manipulations from Grasping:     Feet: i.e. Operating Foot Controls:     Driving / Operate Machinery:     Health Care Provider’s Original or Electronic Signature  Tien Nicholson D.O. Health Care Provider’s Original or Electronic Signature    Tien Nicholson DO MPH     Clinic Name / Location: [address],   [address] [place], [place] [address], [place] [address] Clinic Phone Number: Dept: [phone]   Appointment Time: 1:15 Pm Visit Start Time: 1:12 PM   Check-In Time:  1:07 Pm Visit Discharge Time:  1:45PM   Original-Treating Physician or Chiropractor    Page 2-Insurer/TPA    Page 3-Employer    Page 4-Employee

## 2023-01-25 ENCOUNTER — OCCUPATIONAL MEDICINE (OUTPATIENT)
Dept: OCCUPATIONAL MEDICINE | Facility: CLINIC | Age: 48
End: 2023-01-25
Payer: COMMERCIAL

## 2023-01-25 VITALS
WEIGHT: 269 LBS | HEART RATE: 69 BPM | TEMPERATURE: 98.6 F | OXYGEN SATURATION: 100 % | DIASTOLIC BLOOD PRESSURE: 70 MMHG | SYSTOLIC BLOOD PRESSURE: 120 MMHG | BODY MASS INDEX: 33.45 KG/M2 | HEIGHT: 75 IN

## 2023-01-25 DIAGNOSIS — F07.81 POSTCONCUSSIVE SYNDROME: ICD-10-CM

## 2023-01-25 PROCEDURE — 99213 OFFICE O/P EST LOW 20 MIN: CPT | Performed by: PREVENTIVE MEDICINE

## 2023-01-25 ASSESSMENT — ENCOUNTER SYMPTOMS
NECK PAIN: 1
DIZZINESS: 1
HEADACHES: 1

## 2023-01-25 NOTE — PROGRESS NOTES
"Subjective:     Manuel Thomason is a 47 y.o. 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  He struck his head on the metal frame of the low doorway and had an immediate syncopal episode that was observed by coworkers.  Patient states overall symptoms are about the same.  Has infrequent dizziness/vertigo.  He states an episode few days ago that lasted over 30 minutes.  He states he continues to note hearing loss and tinnitus.  He continues to have headaches which he controls with ibuprofen and Tylenol.  Has slight neck discomfort which he feels may be triggering headaches.  Patient states that his claim is being litigated he went to court last week but is still waiting for the judges ruling.    Review of Systems   Constitutional:  Positive for malaise/fatigue.   Musculoskeletal:  Positive for neck pain.   Neurological:  Positive for dizziness and headaches.     SOCHX: Works as a  at [employer]  FH: No pertinent family history to this problem.       Objective:     /70   Pulse 69   Temp 37 °C (98.6 °F)   Ht 1.905 m (6' 3\")   Wt 122 kg (269 lb)   SpO2 100%   BMI 33.62 kg/m²     Constitutional: Patient is in no acute distress. Appears well-developed and well-nourished.   Cardiovascular: Normal rate.    Pulmonary/Chest: Effort normal. No respiratory distress.   Neurological: Patient is alert and oriented to person, place, and time.   Skin: Skin is warm and dry.   Psychiatric: Normal mood and affect. Behavior is normal.     Neuro: Alert and oriented x3.  Cranial nerves grossly intact.  Coordination intact.  Romberg negative.    Cervical: No gross deformity.  Full range of motion.  HEENT: JONNY.  EOMI.      Assessment/Plan:       1. Postconcussive syndrome  - Referral to Chiropractic    Released to Restricted Duty FROM 1/25/2023 TO " 2/22/2023  No performing safety sensitive tasks such as driving or operating machinery.  Limit screen time to 1 hour per day.  Primarily seated work.       Recommend vestibular therapy, pending approval  Referral for CT head without, pending approval  Referral for ENT, pending approval    Placed referral for chiropractor visits to work on neck to possibly  relief headaches  Ibuprofen 800 mg 3 times daily May take with Tylenol 1000 mg 3 times daily as needed  Restricted duty  Follow-up 4 weeks     Differential diagnosis, natural history, supportive care, and indications for immediate follow-up discussed.    Approximately 25 minutes was spent in preparing for visit, obtaining history, exam and evaluation, patient counseling/education and post visit documentation/orders.

## 2023-01-25 NOTE — LETTER
[address],   [address] [place] [address]  Phone:  [phone] - Fax:  [phone]   Occupational Health [facility] Progress Report and Disability Certification  Date of Service: 1/25/2023   No Show:  No  Date / Time of Next Visit: 2/22/2023@11 AM   Claim Information   Patient Name: Manuel Thomason  Claim Number:     Employer: [employer]  Date of Injury: 10/20/2022     Insurer / TPA: Ailyn [place]  ID / SSN:     Occupation: Package   Diagnosis: The encounter diagnosis was Postconcussive syndrome.    Medical Information   Related to Industrial Injury? 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  He struck his head on the metal frame of the low doorway and had an immediate syncopal episode that was observed by coworkers.  Patient states overall symptoms are about the same.  Has infrequent dizziness/vertigo.  He states an episode few days ago that lasted over 30 minutes.  He states he continues to note hearing loss and tinnitus.  He continues to have headaches which he controls with ibuprofen and Tylenol.  Has slight neck discomfort which he feels may be triggering headaches.  Patient states that his claim is being litigated he went to court last week but is still waiting for the judges ruling.   Objective Findings: Neuro: Alert and oriented x3.  Cranial nerves grossly intact.  Coordination intact.  Romberg negative.    Cervical: No gross deformity.  Full range of motion.  HEENT: PERRLA.  EOMI.     Pre-Existing Condition(s):     Assessment:   Condition Same    Status: Additional Care Required  Permanent Disability:No    Plan:      Diagnostics:      Comments:  Recommend vestibular therapy, pending approval  Referral for CT head without, pending approval  Referral for ENT, pending approval    Placed referral for chiropractor visits to work  on neck to possibly  relief headaches  Ibuprofen 800 mg 3 times daily May take with Tylenol 1000 mg 3 times daily as needed  Restricted duty  Follow-up 4 weeks     Disability Information   Status: Released to Restricted Duty    From:  1/25/2023  Through: 2/22/2023 Restrictions are: Temporary   Physical Restrictions   Sitting:    Standing:    Stooping:    Bending:      Squatting:    Walking:    Climbing:    Pushing:      Pulling:    Other:    Reaching Above Shoulder (L):   Reaching Above Shoulder (R):       Reaching Below Shoulder (L):    Reaching Below Shoulder (R):      Not to exceed Weight Limits   Carrying(hrs):   Weight Limit(lb): < or = to 25 pounds Lifting(hrs):   Weight  Limit(lb): < or = to 25 pounds   Comments: No performing safety sensitive tasks such as driving or operating machinery.  Limit screen time to 1 hour per day.  Primarily seated work.       Repetitive Actions   Hands: i.e. Fine Manipulations from Grasping:     Feet: i.e. Operating Foot Controls:     Driving / Operate Machinery:     Health Care Provider’s Original or Electronic Signature  Tien Nicholson D.O. Health Care Provider’s Original or Electronic Signature    Tien Nicholson DO MPH     Clinic Name / Location: [address],   [address]  [place] [address] Clinic Phone Number: Dept: [phone]   Appointment Time: 11:15 Am Visit Start Time: 11:07 AM   Check-In Time:  11:05 Am Visit Discharge Time:  11:46 AM   Original-Treating Physician or Chiropractor    Page 2-Insurer/TPA    Page 3-Employer    Page 4-Employee

## 2023-02-22 ENCOUNTER — OCCUPATIONAL MEDICINE (OUTPATIENT)
Dept: OCCUPATIONAL MEDICINE | Facility: CLINIC | Age: 48
End: 2023-02-22
Payer: COMMERCIAL

## 2023-02-22 VITALS
SYSTOLIC BLOOD PRESSURE: 134 MMHG | HEIGHT: 75 IN | TEMPERATURE: 98.6 F | WEIGHT: 258 LBS | BODY MASS INDEX: 32.08 KG/M2 | RESPIRATION RATE: 16 BRPM | DIASTOLIC BLOOD PRESSURE: 78 MMHG | OXYGEN SATURATION: 98 % | HEART RATE: 78 BPM

## 2023-02-22 DIAGNOSIS — M54.12 CERVICAL RADICULOPATHY: ICD-10-CM

## 2023-02-22 DIAGNOSIS — F07.81 POSTCONCUSSIVE SYNDROME: ICD-10-CM

## 2023-02-22 PROCEDURE — 99213 OFFICE O/P EST LOW 20 MIN: CPT | Performed by: PREVENTIVE MEDICINE

## 2023-02-22 ASSESSMENT — ENCOUNTER SYMPTOMS
TINGLING: 1
SENSORY CHANGE: 1
DIZZINESS: 1
HEADACHES: 1

## 2023-02-22 NOTE — PROGRESS NOTES
"Subjective:     Manuel Thomason is a 47 y.o. 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  He struck his head on the metal frame of the low doorway and had an immediate syncopal episode that was observed by coworkers.  Patient states that overall neck pain and headaches are about the same.  He controls pain with ibuprofen 800 mg and Tylenol.  He states that the dizziness has been improving and he has not had very many episodes lately.  He states that he did go to court and the  did rule in his favor and the work comp insurance has 30 days to approve all the recommended treatments.  Its been over 20 days but has not received any approval yet.  He will be monitoring this with his .    Review of Systems   HENT:  Positive for hearing loss.    Neurological:  Positive for dizziness, tingling, sensory change and headaches.     SOCHX: Works as a  at [employer]  FH: No pertinent family history to this problem.       Objective:     /78 (BP Location: Left arm, Patient Position: Sitting, BP Cuff Size: Adult long)   Pulse 78   Temp 37 °C (98.6 °F) (Temporal)   Resp 16   Ht 1.905 m (6' 3\")   Wt 117 kg (258 lb)   SpO2 98%   BMI 32.25 kg/m²     Constitutional: Patient is in no acute distress. Appears well-developed and well-nourished.   Cardiovascular: Normal rate.    Pulmonary/Chest: Effort normal. No respiratory distress.   Neurological: Patient is alert and oriented to person, place, and time.   Skin: Skin is warm and dry.   Psychiatric: Normal mood and affect. Behavior is normal.     Neuro: Alert and oriented x3.  Cranial nerves grossly intact.    Cervical: No gross deformity.  Full range of motion.  HEENT: JONNY.  EOMI.      Assessment/Plan:       1. Cervical radiculopathy    2. Postconcussive syndrome    Released to " Restricted Duty FROM 2/22/2023 TO 3/8/2023  No performing safety sensitive tasks such as driving or operating machinery.  Limit screen time to 1 hour per day.  Primarily seated work.        Discussed risk and benefits of headache prophylaxis with amitriptyline, patient wishes to hold off on this for now  Recommend vestibular therapy, pending approval  Referral for CT head without, pending approval  Referral for ENT, pending approval  Referral for chiropractic treatments, pending approval  Ibuprofen 800 mg 3 times daily May take with Tylenol 1000 mg 3 times daily as needed  Restricted duty  Follow-up 4 weeks     Differential diagnosis, natural history, supportive care, and indications for immediate follow-up discussed.    Approximately 25 minutes was spent in preparing for visit, obtaining history, exam and evaluation, patient counseling/education and post visit documentation/orders.

## 2023-02-22 NOTE — LETTER
[address],   [address] [place] [address]  Phone:  [phone] - Fax:  [phone]   Occupational Health [facility] Progress Report and Disability Certification  Date of Service: 2/22/2023   No Show:  No  Date / Time of Next Visit: 3/8/2023   Claim Information   Patient Name: Manuel Thomason  Claim Number:     Employer: [employer]  Date of Injury: 10/20/2022     Insurer / TPA: Ailyn [place]  ID / SSN:     Occupation: Package   Diagnosis: Diagnoses of Cervical radiculopathy and Postconcussive syndrome were pertinent to this visit.    Medical Information   Related to Industrial Injury? 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  He struck his head on the metal frame of the low doorway and had an immediate syncopal episode that was observed by coworkers.  Patient states that overall neck pain and headaches are about the same.  He controls pain with ibuprofen 800 mg and Tylenol.  He states that the dizziness has been improving and he has not had very many episodes lately.  He states that he did go to court and the  did rule in his favor and the work comp insurance has 30 days to approve all the recommended treatments.  Its been over 20 days but has not received any approval yet.  He will be monitoring this with his .   Objective Findings: Neuro: Alert and oriented x3.  Cranial nerves grossly intact.    Cervical: No gross deformity.  Full range of motion.  HEENT: JONNY.  EOMI.     Pre-Existing Condition(s):     Assessment:   Condition Same    Status: Additional Care Required  Permanent Disability:No    Plan:      Diagnostics:      Comments:  Discussed risk and benefits of headache prophylaxis with amitriptyline, patient wishes to hold off on this for now  Recommend vestibular therapy, pending approval  Referral for CT head  without, pending approval  Referral for ENT, pending approval  Referral for chiropractic treatments, pending approval  Ibuprofen 800 mg 3 times daily May take with Tylenol 1000 mg 3 times daily as needed  Restricted duty  Follow-up 4 weeks     Disability Information   Status: Released to Restricted Duty    From:  2/22/2023  Through: 3/8/2023 Restrictions are: Temporary   Physical Restrictions   Sitting:    Standing:    Stooping:    Bending:      Squatting:    Walking:    Climbing:    Pushing:      Pulling:    Other:    Reaching Above Shoulder (L):   Reaching Above Shoulder (R):       Reaching Below Shoulder (L):    Reaching Below Shoulder (R):      Not to exceed Weight Limits   Carrying(hrs):   Weight Limit(lb): < or = to 25 pounds Lifting(hrs):   Weight  Limit(lb): < or = to 25 pounds   Comments: No performing safety sensitive tasks such as driving or operating machinery.  Limit screen time to 1 hour per day.  Primarily seated work.        Repetitive Actions   Hands: i.e. Fine Manipulations from Grasping:     Feet: i.e. Operating Foot Controls:     Driving / Operate Machinery:     Health Care Provider’s Original or Electronic Signature  Tien Nicholson D.O. Health Care Provider’s Original or Electronic Signature    Tien Nicholson DO MPH     Clinic Name / Location: [address],   Suite [facility]  [place] [address] Clinic Phone Number: Dept: [phone]   Appointment Time: 11:00 Am Visit Start Time: 10:53 AM   Check-In Time:  10:51 Am Visit Discharge Time:  11:19 AM   Original-Treating Physician or Chiropractor    Page 2-Insurer/TPA    Page 3-Employer    Page 4-Employee

## 2023-03-08 ENCOUNTER — OCCUPATIONAL MEDICINE (OUTPATIENT)
Dept: OCCUPATIONAL MEDICINE | Facility: CLINIC | Age: 48
End: 2023-03-08
Payer: COMMERCIAL

## 2023-03-08 VITALS
OXYGEN SATURATION: 98 % | RESPIRATION RATE: 16 BRPM | WEIGHT: 252 LBS | DIASTOLIC BLOOD PRESSURE: 92 MMHG | HEIGHT: 75 IN | SYSTOLIC BLOOD PRESSURE: 146 MMHG | TEMPERATURE: 98.6 F | BODY MASS INDEX: 31.33 KG/M2 | HEART RATE: 68 BPM

## 2023-03-08 DIAGNOSIS — M54.12 CERVICAL RADICULOPATHY: ICD-10-CM

## 2023-03-08 DIAGNOSIS — F07.81 POSTCONCUSSIVE SYNDROME: ICD-10-CM

## 2023-03-08 PROCEDURE — 99213 OFFICE O/P EST LOW 20 MIN: CPT | Performed by: PREVENTIVE MEDICINE

## 2023-03-08 ASSESSMENT — ENCOUNTER SYMPTOMS
HEADACHES: 1
DOUBLE VISION: 0
PHOTOPHOBIA: 0
DIZZINESS: 1
BLURRED VISION: 1

## 2023-03-08 NOTE — PROGRESS NOTES
"Subjective:     Manuel Thomason is a 47 y.o. 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  He struck his head on the metal frame of the low doorway and had an immediate syncopal episode that was observed by coworkers.  Patient states that overall since about the same due to the headaches, hearing loss and blurry vision.  He states that he has not received any notice to schedule any of the specialist visits or CT scan.  His  still working on all that.    Review of Systems   Constitutional:  Positive for malaise/fatigue.   HENT:  Positive for hearing loss and tinnitus.    Eyes:  Positive for blurred vision. Negative for double vision and photophobia.   Neurological:  Positive for dizziness and headaches.     SOCHX: Works as a   FH: No pertinent family history to this problem.       Objective:     BP (!) 146/92 (BP Location: Right arm, Patient Position: Sitting, BP Cuff Size: Adult long)   Pulse 68   Temp 37 °C (98.6 °F) (Temporal)   Resp 16   Ht 1.905 m (6' 3\")   Wt 114 kg (252 lb)   SpO2 98%   BMI 31.50 kg/m²     Constitutional: Patient is in no acute distress. Appears well-developed and well-nourished.   Cardiovascular: Normal rate.    Pulmonary/Chest: Effort normal. No respiratory distress.   Neurological: Patient is alert and oriented to person, place, and time.   Skin: Skin is warm and dry.   Psychiatric: Normal mood and affect. Behavior is normal.     Neuro: Alert and oriented x3.  Cranial nerves grossly intact.    Cervical: No gross deformity.  Full range of motion.  HEENT: JONNY.  EOMI.      Assessment/Plan:       1. Cervical radiculopathy    2. Postconcussive syndrome    Released to Restricted Duty FROM 3/8/2023 TO 4/5/2023  No performing safety sensitive tasks such as driving or operating machinery.  " Limit screen time to 1 hour per day.  Primarily seated work.         Discussed risk and benefits of headache prophylaxis with amitriptyline, patient wishes to hold off on this for now  Recommend vestibular therapy, pending approval  Referral for CT head without, pending approval  Referral for ENT, pending approval  Referral for chiropractic treatments, pending approval  Ibuprofen 800 mg 3 times daily May take with Tylenol 1000 mg 3 times daily as needed  Restricted duty  Follow-up 4 weeks      Differential diagnosis, natural history, supportive care, and indications for immediate follow-up discussed.    Approximately 25 minutes was spent in preparing for visit, obtaining history, exam and evaluation, patient counseling/education and post visit documentation/orders.

## 2023-03-08 NOTE — LETTER
[address],   [address] [place] [address]  Phone:  [phone] - Fax:  [phone]   Occupational Health [facility] Progress Report and Disability Certification  Date of Service: 3/8/2023   No Show:  No  Date / Time of Next Visit: 4/5/2023 @ 11:00 AM   Claim Information   Patient Name: Manuel Thomason  Claim Number:     Employer: [employer]  Date of Injury: 10/20/2022     Insurer / TPA: Ailyn [place]  ID / SSN:     Occupation: Package   Diagnosis: Diagnoses of Cervical radiculopathy and Postconcussive syndrome were pertinent to this visit.    Medical Information   Related to Industrial Injury? 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  He struck his head on the metal frame of the low doorway and had an immediate syncopal episode that was observed by coworkers.  Patient states that overall since about the same due to the headaches, hearing loss and blurry vision.  He states that he has not received any notice to schedule any of the specialist visits or CT scan.  His  still working on all that.   Objective Findings: Neuro: Alert and oriented x3.  Cranial nerves grossly intact.    Cervical: No gross deformity.  Full range of motion.  HEENT: PERRLA.  EOMI.     Pre-Existing Condition(s):     Assessment:   Condition Same    Status: Additional Care Required  Permanent Disability:No    Plan:      Diagnostics:      Comments:  Discussed risk and benefits of headache prophylaxis with amitriptyline, patient wishes to hold off on this for now  Recommend vestibular therapy, pending approval  Referral for CT head without, pending approval  Referral for ENT, pending approval  Referral for chiropractic treatments, pending approval  Ibuprofen 800 mg 3 times daily May take with Tylenol 1000 mg 3 times daily as needed  Restricted duty  Follow-up 4  weeks      Disability Information   Status: Released to Restricted Duty    From:  3/8/2023  Through: 4/5/2023 Restrictions are: Temporary   Physical Restrictions   Sitting:    Standing:    Stooping:    Bending:      Squatting:    Walking:    Climbing:    Pushing:      Pulling:    Other:    Reaching Above Shoulder (L):   Reaching Above Shoulder (R):       Reaching Below Shoulder (L):    Reaching Below Shoulder (R):      Not to exceed Weight Limits   Carrying(hrs):   Weight Limit(lb): < or = to 25 pounds Lifting(hrs):   Weight  Limit(lb): < or = to 25 pounds   Comments: No performing safety sensitive tasks such as driving or operating machinery.  Limit screen time to 1 hour per day.  Primarily seated work.         Repetitive Actions   Hands: i.e. Fine Manipulations from Grasping:     Feet: i.e. Operating Foot Controls:     Driving / Operate Machinery:     Health Care Provider’s Original or Electronic Signature  Tien Nicholson D.O. Health Care Provider’s Original or Electronic Signature    Tien Nicholson DO MPH     Clinic Name / Location: [place] [address]  [place] [address] Clinic Phone Number: Dept: [phone]   Appointment Time: 10:45 Am Visit Start Time: 10:55 AM   Check-In Time:  10:48 Am Visit Discharge Time:  11:17 AM   Original-Treating Physician or Chiropractor    Page 2-Insurer/TPA    Page 3-Employer    Page 4-Employee

## 2023-04-05 ENCOUNTER — OCCUPATIONAL MEDICINE (OUTPATIENT)
Dept: OCCUPATIONAL MEDICINE | Facility: CLINIC | Age: 48
End: 2023-04-05
Payer: COMMERCIAL

## 2023-04-05 VITALS
HEART RATE: 86 BPM | OXYGEN SATURATION: 97 % | SYSTOLIC BLOOD PRESSURE: 124 MMHG | WEIGHT: 251 LBS | BODY MASS INDEX: 31.21 KG/M2 | HEIGHT: 75 IN | DIASTOLIC BLOOD PRESSURE: 80 MMHG | RESPIRATION RATE: 18 BRPM

## 2023-04-05 DIAGNOSIS — F07.81 POSTCONCUSSIVE SYNDROME: ICD-10-CM

## 2023-04-05 DIAGNOSIS — M54.12 CERVICAL RADICULOPATHY: ICD-10-CM

## 2023-04-05 PROCEDURE — 99213 OFFICE O/P EST LOW 20 MIN: CPT | Performed by: PREVENTIVE MEDICINE

## 2023-04-05 NOTE — LETTER
[address],   [address] [place] [address]  Phone:  [phone] - Fax:  [phone]   Occupational Health [facility] Progress Report and Disability Certification  Date of Service: 4/5/2023   No Show:  No  Date / Time of Next Visit: 5/3/2023 @ 11:00 AM   Claim Information   Patient Name: Manuel Thomason  Claim Number:     Employer: [employer]  Date of Injury: 10/20/2022     Insurer / TPA: Ailyn [place]  ID / SSN:     Occupation: Package   Diagnosis: Diagnoses of Cervical radiculopathy and Postconcussive syndrome were pertinent to this visit.    Medical Information   Related to Industrial Injury? 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  He struck his head on the metal frame of the low doorway and had an immediate syncopal episode that was observed by coworkers.  Patient states overall symptoms have improved a little bit.  He states been doing vestibular therapy.  He states that a couple times he did that heatedly with low bit of headache but feels good about the treatment overall.  Patient is seeing the STEVIE for the cervical spine conditions.  Patient is still awaiting ENT evaluation.   Objective Findings: Neuro: Alert and oriented x3.  Cranial nerves grossly intact.    Cervical: No gross deformity.  Full range of motion.  HEENT: PERRLA.  EOMI.     Pre-Existing Condition(s):     Assessment:   Condition Same    Status: Additional Care Required  Permanent Disability:No    Plan:      Diagnostics:      Comments:  Continue vestibular therapy  Referral for CT head without, pending approval  Referral for ENT, pending approval  Ibuprofen 800 mg 3 times daily May take with Tylenol 1000 mg 3 times daily as needed  Restricted duty  Follow-up 4 weeks      Disability Information   Status: Released to Restricted Duty    From:  4/5/2023  Through:  5/3/2023 Restrictions are: Temporary   Physical Restrictions   Sitting:    Standing:    Stooping:    Bending:      Squatting:    Walking:    Climbing:    Pushing:      Pulling:    Other:    Reaching Above Shoulder (L):   Reaching Above Shoulder (R):       Reaching Below Shoulder (L):    Reaching Below Shoulder (R):      Not to exceed Weight Limits   Carrying(hrs):   Weight Limit(lb): < or = to 25 pounds Lifting(hrs):   Weight  Limit(lb): < or = to 25 pounds   Comments: No performing safety sensitive tasks such as driving or operating machinery.  Limit screen time to 1 hour per day.  Primarily seated work.         Repetitive Actions   Hands: i.e. Fine Manipulations from Grasping:     Feet: i.e. Operating Foot Controls:     Driving / Operate Machinery:     Health Care Provider’s Original or Electronic Signature  Tien Nicholson D.O. Health Care Provider’s Original or Electronic Signature    Tine Nicholson DO MPH     Cl[address] [place] [address] Clinic Phone Number: Dept: [phone]   Appointment Time: 11:00 Am Visit Start Time: 11:15 AM   Check-In Time:  11:04 Am Visit Discharge Time:  11:36 AM   Original-Treating Physician or Chiropractor    Page 2-Insurer/TPA    Page 3-Employer    Page 4-Employee

## 2023-04-05 NOTE — PROGRESS NOTES
"Subjective:     Manuel Thomason is a 47 y.o. 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  He struck his head on the metal frame of the low doorway and had an immediate syncopal episode that was observed by coworkers.  Patient states overall symptoms have improved a little bit.  He states been doing vestibular therapy.  He states that a couple times he did that heatedly with low bit of headache but feels good about the treatment overall.  Patient is seeing the STEVIE for the cervical spine conditions.  Patient is still awaiting ENT evaluation.    ROS    SOCHX: Works as a  at [employer]  FH: No pertinent family history to this problem.       Objective:     /80   Pulse 86   Resp 18   Ht 1.905 m (6' 3\")   Wt 114 kg (251 lb)   SpO2 97%   BMI 31.37 kg/m²     Constitutional: Patient is in no acute distress. Appears well-developed and well-nourished.   Cardiovascular: Normal rate.    Pulmonary/Chest: Effort normal. No respiratory distress.   Neurological: Patient is alert and oriented to person, place, and time.   Skin: Skin is warm and dry.   Psychiatric: Normal mood and affect. Behavior is normal.     Neuro: Alert and oriented x3.  Cranial nerves grossly intact.    Cervical: No gross deformity.  Full range of motion.  HEENT: JONNY.  JEFMI.      Assessment/Plan:       1. Cervical radiculopathy    2. Postconcussive syndrome    Released to Restricted Duty FROM 4/5/2023 TO 5/3/2023  No performing safety sensitive tasks such as driving or operating machinery.  Limit screen time to 1 hour per day.  Primarily seated work.         Continue vestibular therapy  Referral for CT head without, pending approval  Referral for ENT, pending approval  Ibuprofen 800 mg 3 times daily May take with Tylenol 1000 mg 3 times daily as needed  Restricted duty  Follow-up 4 " weeks      Differential diagnosis, natural history, supportive care, and indications for immediate follow-up discussed.    Approximately 25 minutes was spent in preparing for visit, obtaining history, exam and evaluation, patient counseling/education and post visit documentation/orders.

## 2023-05-03 ENCOUNTER — OCCUPATIONAL MEDICINE (OUTPATIENT)
Dept: OCCUPATIONAL MEDICINE | Facility: CLINIC | Age: 48
End: 2023-05-03
Payer: COMMERCIAL

## 2023-05-03 VITALS
OXYGEN SATURATION: 97 % | TEMPERATURE: 96.7 F | DIASTOLIC BLOOD PRESSURE: 82 MMHG | WEIGHT: 248 LBS | SYSTOLIC BLOOD PRESSURE: 122 MMHG | HEIGHT: 75 IN | HEART RATE: 84 BPM | BODY MASS INDEX: 30.84 KG/M2

## 2023-05-03 DIAGNOSIS — M54.12 CERVICAL RADICULOPATHY: ICD-10-CM

## 2023-05-03 DIAGNOSIS — F07.81 POSTCONCUSSIVE SYNDROME: ICD-10-CM

## 2023-05-03 PROCEDURE — 99213 OFFICE O/P EST LOW 20 MIN: CPT | Performed by: PREVENTIVE MEDICINE

## 2023-05-03 RX ORDER — TIZANIDINE 4 MG/1
4 TABLET ORAL
Qty: 20 TABLET | Refills: 0 | Status: SHIPPED | OUTPATIENT
Start: 2023-05-03

## 2023-05-03 ASSESSMENT — ENCOUNTER SYMPTOMS
HEADACHES: 1
DIZZINESS: 1

## 2023-05-03 NOTE — PROGRESS NOTES
"Subjective:     Manuel Thomason is a 47 y.o. 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  He struck his head on the metal frame of the low doorway and had an immediate syncopal episode that was observed by coworkers.  Patient states that overall symptoms are improving in regards to the dizziness.  However he states he does continue to have some hearing loss on the left side.  He also continues to have neck pain he states this is worsened somewhat and is affecting his right shoulder.  Does not recall the incident or trauma that elicited the flareup of symptoms.    Review of Systems   HENT:  Positive for hearing loss. Negative for tinnitus.    Neurological:  Positive for dizziness and headaches.     SOCHX: Works as a  at [employer]  FH: No pertinent family history to this problem.       Objective:     /82   Pulse 84   Temp 35.9 °C (96.7 °F)   Ht 1.905 m (6' 3\")   Wt 112 kg (248 lb)   SpO2 97%   BMI 31.00 kg/m²     Constitutional: Patient is in no acute distress. Appears well-developed and well-nourished.   Cardiovascular: Normal rate.    Pulmonary/Chest: Effort normal. No respiratory distress.   Neurological: Patient is alert and oriented to person, place, and time.   Skin: Skin is warm and dry.   Psychiatric: Normal mood and affect. Behavior is normal.     Neuro: Alert and oriented x3.  Cranial nerves grossly intact.    Cervical: No gross deformity.  Limited range of motion with rotation.  Some limited range of motion of the right shoulder as well.  HEENT: PERRLA.  EOMI.      CT head: No acute deformity  MRI cervical: Mild neuroforaminal narrowing at C4 3-4, C4-5 and C5-6.  No significant difference from prior exam.    Assessment/Plan:       1. Cervical radiculopathy  - tizanidine (ZANAFLEX) 4 MG Tab; Take 1 Tablet by mouth at bedtime as " needed (spasm).  Dispense: 20 Tablet; Refill: 0    2. Postconcussive syndrome    Released to Restricted Duty FROM 5/3/2023 TO 5/31/2023   No performing safety sensitive tasks such as driving or operating machinery.  Limit screen time to 1 hour per day.  Primarily seated work.          Continue vestibular therapy  Referral for ENT, pending approval  Ibuprofen 800 mg 3 times daily May take with Tylenol 1000 mg 3 times daily as needed  Prescribed tizanidine 4 mg to use at night as needed  Restricted duty  Follow-up 4 weeks       Differential diagnosis, natural history, supportive care, and indications for immediate follow-up discussed.    Approximately 25 minutes was spent in preparing for visit, obtaining history, exam and evaluation, patient counseling/education and post visit documentation/orders.

## 2023-05-03 NOTE — LETTER
[address],   [address] [place] [address]  Phone:  [phone] - Fax:  [phone]   Occupational Health [facility] Progress Report and Disability Certification  Date of Service: 5/3/2023   No Show:  No  Date / Time of Next Visit: 5/31/2023 @11Am   Claim Information   Patient Name: Manuel Thomason  Claim Number:     Employer: [employer]  Date of Injury: 10/20/2022     Insurer / TPA: Ailyn [place]  ID / SSN:     Occupation: Package   Diagnosis: Diagnoses of Cervical radiculopathy and Postconcussive syndrome were pertinent to this visit.    Medical Information   Related to Industrial Injury? 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  He struck his head on the metal frame of the low doorway and had an immediate syncopal episode that was observed by coworkers.  Patient states that overall symptoms are improving in regards to the dizziness.  However he states he does continue to have some hearing loss on the left side.  He also continues to have neck pain he states this is worsened somewhat and is affecting his right shoulder.  Does not recall the incident or trauma that elicited the flareup of symptoms.   Objective Findings: Neuro: Alert and oriented x3.  Cranial nerves grossly intact.    Cervical: No gross deformity.  Limited range of motion with rotation.  Some limited range of motion of the right shoulder as well.  HEENT: PERRLA.  EOMI.      CT head: No acute deformity  MRI cervical: Mild neuroforaminal narrowing at C4 3-4, C4-5 and C5-6.  No significant difference from prior exam.   Pre-Existing Condition(s):     Assessment:   Condition Same    Status: Additional Care Required  Permanent Disability:No    Plan:      Diagnostics:      Comments:  Continue vestibular therapy  Referral for ENT, pending approval  Ibuprofen 800 mg 3 times  daily May take with Tylenol 1000 mg 3 times daily as needed  Prescribed tizanidine 4 mg to use at night as needed  Restricted duty  Follow-up 4 weeks       Disability Information   Status: Released to Restricted Duty    From:  5/3/2023  Through: 5/31/2023 Restrictions are: Temporary   Physical Restrictions   Sitting:    Standing:    Stooping:    Bending:      Squatting:    Walking:    Climbing:    Pushing:      Pulling:    Other:    Reaching Above Shoulder (L):   Reaching Above Shoulder (R):       Reaching Below Shoulder (L):    Reaching Below Shoulder (R):      Not to exceed Weight Limits   Carrying(hrs):   Weight Limit(lb): < or = to 25 pounds Lifting(hrs):   Weight  Limit(lb): < or = to 25 pounds   Comments:  No performing safety sensitive tasks such as driving or operating machinery.  Limit screen time to 1 hour per day.  Primarily seated work.          Repetitive Actions   Hands: i.e. Fine Manipulations from Grasping:     Feet: i.e. Operating Foot Controls:     Driving / Operate Machinery:     Health Care Provider’s Original or Electronic Signature  Tien Nicholson D.O. Health Care Provider’s Original or Electronic Signature    Tein Nicholson DO MPH     Clinic Name / Location: [address],   [address] [place] [address] Clinic Phone Number: Dept: [phone]   Appointment Time: 11:00 Am Visit Start Time: 11:09 AM   Check-In Time:  11:09 Am Visit Discharge Time:  11:43Am   Original-Treating Physician or Chiropractor    Page 2-Insurer/TPA    Page 3-Employer    Page 4-Employee

## 2023-05-31 ENCOUNTER — OCCUPATIONAL MEDICINE (OUTPATIENT)
Dept: OCCUPATIONAL MEDICINE | Facility: CLINIC | Age: 48
End: 2023-05-31
Payer: COMMERCIAL

## 2023-05-31 VITALS
HEIGHT: 75 IN | OXYGEN SATURATION: 97 % | TEMPERATURE: 98.6 F | RESPIRATION RATE: 16 BRPM | WEIGHT: 248 LBS | DIASTOLIC BLOOD PRESSURE: 76 MMHG | HEART RATE: 86 BPM | SYSTOLIC BLOOD PRESSURE: 130 MMHG | BODY MASS INDEX: 30.84 KG/M2

## 2023-05-31 DIAGNOSIS — M54.12 CERVICAL RADICULOPATHY: ICD-10-CM

## 2023-05-31 DIAGNOSIS — F07.81 POSTCONCUSSIVE SYNDROME: ICD-10-CM

## 2023-05-31 PROCEDURE — 99213 OFFICE O/P EST LOW 20 MIN: CPT | Performed by: PREVENTIVE MEDICINE

## 2023-05-31 PROCEDURE — 3078F DIAST BP <80 MM HG: CPT | Performed by: PREVENTIVE MEDICINE

## 2023-05-31 PROCEDURE — 3075F SYST BP GE 130 - 139MM HG: CPT | Performed by: PREVENTIVE MEDICINE

## 2023-05-31 NOTE — LETTER
[address],   [address] [place] [address]  Phone:  [phone] - Fax:  [phone]   [facility] Progress Report and Disability Certification  Date of Service: 5/31/2023   No Show:  No  Date / Time of Next Visit: 6/28/2023@11 AM   Claim Information   Patient Name: Manuel Thomason  Claim Number:     Employer: [employer]  Date of Injury: 10/20/2022     Insurer / TPA: Ailyn [place]  ID / SSN:     Occupation: Package   Diagnosis: Diagnoses of Cervical radiculopathy and Postconcussive syndrome were pertinent to this visit.    Medical Information   Related to Industrial Injury? 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  He struck his head on the metal frame of the low doorway and had an immediate syncopal episode that was observed by coworkers.      Prior treatments: Vestibular therapy, ibuprofen, diclofenac, activity modification and tizanidine.    Patient states that he did finish vestibular therapy 2 weeks ago and has noted some good improvement in dizziness.  He states the dizziness is mostly resolved and has only had a few episodes.  He states the headaches as well seem to be mostly improved.  He does still feel a little knot on the top of his head.  He states the symptoms in the neck do continue to be persistent.  He states he saw Dr. Dorado under his old work comp injury and he was requested to be part of this work comp injury since it is affecting his neck as well.   Objective Findings: Neuro: Alert and oriented x3.  Cranial nerves grossly intact.    Cervical: No gross deformity.  Limited range of motion with rotation.  Some limited range of motion of the right shoulder as well.  HEENT: PERRLA.  EOMI.       CT head: No acute deformity  MRI cervical: Mild neuroforaminal narrowing at C4 3-4, C4-5 and C5-6.  No  significant difference from prior exam.      Pre-Existing Condition(s):     Assessment:   Condition Same    Status: Additional Care Required  Permanent Disability:No    Plan:      Diagnostics:      Comments:  Referral for neurosurgery-Dr. Dorado  Referral for ENT, pending approval, placed new referral  Ibuprofen 800 mg 3 times daily as needed  May take with Tylenol 1000 mg 3 times daily as needed  Continue tizanidine as needed as needed restricted duty  Follow-up 4 weeks     Disability Information   Status: Released to Restricted Duty    From:  5/31/2023  Through: 6/28/2023 Restrictions are: Temporary   Physical Restrictions   Sitting:    Standing:    Stooping:    Bending:      Squatting:    Walking:    Climbing:    Pushing:      Pulling:    Other:    Reaching Above Shoulder (L):   Reaching Above Shoulder (R):       Reaching Below Shoulder (L):    Reaching Below Shoulder (R):      Not to exceed Weight Limits   Carrying(hrs):   Weight Limit(lb): < or = to 25 pounds Lifting(hrs):   Weight  Limit(lb): < or = to 25 pounds   Comments: No performing safety sensitive tasks such as driving or operating machinery.  Limit screen time to 1 hour per day.  Primarily seated work.      Repetitive Actions   Hands: i.e. Fine Manipulations from Grasping:     Feet: i.e. Operating Foot Controls:     Driving / Operate Machinery:     Health Care Provider’s Original or Electronic Signature  Tien Nicholson D.O. Health Care Provider’s Original or Electronic Signature    Tien Nicholson DO MPH     Clinic Name / Location: [address],   [address]  [place] [address] Clinic Phone Number: Dept: [phone]   Appointment Time: 11:00 Am Visit Start Time: 10:56 AM   Check-In Time:  10:54 Am Visit Discharge Time:  11:18 AM   Original-Treating Physician or Chiropractor    Page 2-Insurer/TPA    Page 3-Employer    Page 4-Employee

## 2023-05-31 NOTE — PROGRESS NOTES
"Subjective:     Manuel Thomason is a 47 y.o. 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  He struck his head on the metal frame of the low doorway and had an immediate syncopal episode that was observed by coworkers.      Prior treatments: Vestibular therapy, ibuprofen, diclofenac, activity modification and tizanidine.    Patient states that he did finish vestibular therapy 2 weeks ago and has noted some good improvement in dizziness.  He states the dizziness is mostly resolved and has only had a few episodes.  He states the headaches as well seem to be mostly improved.  He does still feel a little knot on the top of his head.  He states the symptoms in the neck do continue to be persistent.  He states he saw Dr. Dorado under his old work comp injury and he was requested to be part of this work comp injury since it is affecting his neck as well.    ROS    SOCHX: Works as a   FH: No pertinent family history to this problem.       Objective:     /76 (BP Location: Right arm, Patient Position: Sitting, BP Cuff Size: Large adult)   Pulse 86   Temp 37 °C (98.6 °F) (Temporal)   Resp 16   Ht 1.905 m (6' 3\")   Wt 112 kg (248 lb)   SpO2 97%   BMI 31.00 kg/m²     Constitutional: Patient is in no acute distress. Appears well-developed and well-nourished.   Cardiovascular: Normal rate.    Pulmonary/Chest: Effort normal. No respiratory distress.   Neurological: Patient is alert and oriented to person, place, and time.   Skin: Skin is warm and dry.   Psychiatric: Normal mood and affect. Behavior is normal.     Neuro: Alert and oriented x3.  Cranial nerves grossly intact.    Cervical: No gross deformity.  Limited range of motion with rotation.  Some limited range of motion of the right shoulder as well.  HEENT: JONNY.  JEFMI.       CT " head: No acute deformity  MRI cervical: Mild neuroforaminal narrowing at C4 3-4, C4-5 and C5-6.  No significant difference from prior exam.       Assessment/Plan:       1. Cervical radiculopathy  - Referral to Neurosurgery    2. Postconcussive syndrome  - Referral to Neurosurgery  - Referral to ENT    Released to Restricted Duty FROM 5/31/2023 TO 6/28/2023  No performing safety sensitive tasks such as driving or operating machinery.  Limit screen time to 1 hour per day.  Primarily seated work.      Referral for neurosurgery-Dr. Dorado  Referral for ENT, pending approval, placed new referral  Ibuprofen 800 mg 3 times daily as needed  May take with Tylenol 1000 mg 3 times daily as needed  Continue tizanidine as needed as needed restricted duty  Follow-up 4 weeks     Differential diagnosis, natural history, supportive care, and indications for immediate follow-up discussed.    Approximately 25 minutes was spent in preparing for visit, obtaining history, exam and evaluation, patient counseling/education and post visit documentation/orders.

## 2023-07-11 ENCOUNTER — OCCUPATIONAL MEDICINE (OUTPATIENT)
Dept: OCCUPATIONAL MEDICINE | Facility: CLINIC | Age: 48
End: 2023-07-11
Payer: COMMERCIAL

## 2023-07-11 VITALS
TEMPERATURE: 98.2 F | HEIGHT: 75 IN | DIASTOLIC BLOOD PRESSURE: 80 MMHG | OXYGEN SATURATION: 95 % | RESPIRATION RATE: 14 BRPM | SYSTOLIC BLOOD PRESSURE: 130 MMHG | HEART RATE: 80 BPM | WEIGHT: 255 LBS | BODY MASS INDEX: 31.71 KG/M2

## 2023-07-11 DIAGNOSIS — M54.12 CERVICAL RADICULOPATHY: ICD-10-CM

## 2023-07-11 DIAGNOSIS — F07.81 POSTCONCUSSIVE SYNDROME: ICD-10-CM

## 2023-07-11 PROCEDURE — 3075F SYST BP GE 130 - 139MM HG: CPT | Performed by: PREVENTIVE MEDICINE

## 2023-07-11 PROCEDURE — 3079F DIAST BP 80-89 MM HG: CPT | Performed by: PREVENTIVE MEDICINE

## 2023-07-11 PROCEDURE — 99213 OFFICE O/P EST LOW 20 MIN: CPT | Performed by: PREVENTIVE MEDICINE

## 2023-07-11 ASSESSMENT — ENCOUNTER SYMPTOMS
DIZZINESS: 0
HEADACHES: 0

## 2023-07-11 NOTE — LETTER
[address],   [address] [place] [address]  Phone:  [phone] - Fax:  [phone]   [facility] Progress Report and Disability Certification  Date of Service: 7/11/2023   No Show:  No  Date / Time of Next Visit: 9/6/2023@11:15 am   Claim Information   Patient Name: Manuel Thomason  Claim Number:     Employer: [employer]  Date of Injury: 10/20/2022     Insurer / TPA: Ailyn [place]  ID / SSN:     Occupation: Package   Diagnosis: Diagnoses of Cervical radiculopathy and Postconcussive syndrome were pertinent to this visit.    Medical Information   Related to Industrial Injury? 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  He struck his head on the metal frame of the low doorway and had an immediate syncopal episode that was observed by coworkers.       Prior treatments: Vestibular therapy, ibuprofen, diclofenac, activity modification and tizanidine.    Patient states that dizziness continues to be improved.  Feels comfortable driving motor vehicles.  Still has hearing issues.  Seeing Dr. Dorado for cervical spine issues.   Objective Findings: Neuro: Alert and oriented x3.  Cranial nerves grossly intact.    Cervical: No gross deformity.  Limited range of motion with rotation.  Some limited range of motion of the right shoulder as well.  HEENT: PERRLA.  EOMI.       CT head: No acute deformity  MRI cervical: Mild neuroforaminal narrowing at C4 3-4, C4-5 and C5-6.  No significant difference from prior exam.   Pre-Existing Condition(s):     Assessment:   Condition Same    Status: Additional Care Required  Permanent Disability:No    Plan:      Diagnostics:      Comments:  Referral for neurosurgery - Dr. Dorado  Referral for ENT, pending approval, placed new referral  Ibuprofen 800 mg 3 times daily as needed  May take with Tylenol  1000 mg 3 times daily as needed  Restricted duty through July 16, full duty starting July 17  Follow-up 4 weeks     Disability Information   Status:   Comments:Restricted duty through July 16, full duty starting July 17    From:  7/11/2023  Through: 9/6/2023 Restrictions are: Temporary   Physical Restrictions   Sitting:    Standing:    Stooping:    Bending:      Squatting:    Walking:    Climbing:    Pushing:      Pulling:    Other:    Reaching Above Shoulder (L):   Reaching Above Shoulder (R):       Reaching Below Shoulder (L):    Reaching Below Shoulder (R):      Not to exceed Weight Limits   Carrying(hrs):   Weight Limit(lb):   Lifting(hrs):   Weight  Limit(lb):     Comments: Restricted duty through July 16, full duty starting July 17  Restrictions thru July 16th: No performing safety sensitive tasks such as driving or operating machinery.  Less than 25 pounds lifting    Repetitive Actions   Hands: i.e. Fine Manipulations from Grasping:     Feet: i.e. Operating Foot Controls:     Driving / Operate Machinery:     Health Care Provider’s Original or Electronic Signature  Tien Nicholson D.O. Health Care Provider’s Original or Electronic Signature    Tien Nicholson DO MPH     Clinic Name / Location: [address],   [address]  [place] [address] Clinic Phone Number: Dept: [phone]   Appointment Time: 11:00 Am Visit Start Time: 10:55 AM   Check-In Time:  10:50 Am Visit Discharge Time:  11:31 AM   Original-Treating Physician or Chiropractor    Page 2-Insurer/TPA    Page 3-Employer    Page 4-Employee

## 2023-07-11 NOTE — PROGRESS NOTES
"Subjective:     Manuel Thomason is a 47 y.o. 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  He struck his head on the metal frame of the low doorway and had an immediate syncopal episode that was observed by coworkers.       Prior treatments: Vestibular therapy, ibuprofen, diclofenac, activity modification and tizanidine.    Patient states that dizziness continues to be improved.  Feels comfortable driving motor vehicles.  Still has hearing issues.  Seeing Dr. Dorado for cervical spine issues.    Review of Systems   HENT:  Positive for hearing loss.    Neurological:  Negative for dizziness and headaches.       SOCHX: Works as a  at [employer]  FH: No pertinent family history to this problem.       Objective:     /80   Pulse 80   Temp 36.8 °C (98.2 °F) (Temporal)   Resp 14   Ht 1.905 m (6' 3\")   Wt 116 kg (255 lb)   SpO2 95%   BMI 31.87 kg/m²     Constitutional: Patient is in no acute distress. Appears well-developed and well-nourished.   Cardiovascular: Normal rate.    Pulmonary/Chest: Effort normal. No respiratory distress.   Neurological: Patient is alert and oriented to person, place, and time.   Skin: Skin is warm and dry.   Psychiatric: Normal mood and affect. Behavior is normal.     Neuro: Alert and oriented x3.  Cranial nerves grossly intact.    Cervical: No gross deformity.  Limited range of motion with rotation.  Some limited range of motion of the right shoulder as well.  HEENT: PERRLA.  EOMI.       CT head: No acute deformity  MRI cervical: Mild neuroforaminal narrowing at C4 3-4, C4-5 and C5-6.  No significant difference from prior exam.    Assessment/Plan:       1. Cervical radiculopathy    2. Postconcussive syndrome      Comments:Restricted duty through July 16, full duty starting July 17 FROM 7/11/2023 TO 9/6/2023  Restricted " duty through July 16, full duty starting July 17  Restrictions thru July 16th: No performing safety sensitive tasks such as driving or operating machinery.  Less than 25 pounds lifting    Referral for neurosurgery - Dr. Dorado  Referral for ENT, pending approval, placed new referral  Ibuprofen 800 mg 3 times daily as needed  May take with Tylenol 1000 mg 3 times daily as needed  Restricted duty through July 16, full duty starting July 17  Follow-up 4 weeks     Differential diagnosis, natural history, supportive care, and indications for immediate follow-up discussed.    Approximately 25 minutes was spent in preparing for visit, obtaining history, exam and evaluation, patient counseling/education and post visit documentation/orders.

## 2023-07-17 ENCOUNTER — OFFICE VISIT (OUTPATIENT)
Dept: URGENT CARE | Facility: PHYSICIAN GROUP | Age: 48
End: 2023-07-17
Payer: COMMERCIAL

## 2023-07-17 VITALS
SYSTOLIC BLOOD PRESSURE: 128 MMHG | HEART RATE: 68 BPM | HEIGHT: 75 IN | BODY MASS INDEX: 31.58 KG/M2 | OXYGEN SATURATION: 98 % | DIASTOLIC BLOOD PRESSURE: 76 MMHG | RESPIRATION RATE: 14 BRPM | WEIGHT: 254 LBS | TEMPERATURE: 98.2 F

## 2023-07-17 DIAGNOSIS — Z02.89 ENCOUNTER FOR EXAMINATION REQUIRED BY DEPARTMENT OF TRANSPORTATION (DOT): ICD-10-CM

## 2023-07-17 DIAGNOSIS — R31.29 MICROSCOPIC HEMATURIA: ICD-10-CM

## 2023-07-17 PROCEDURE — 7100 PR DOT PHYSICAL: Performed by: FAMILY MEDICINE

## 2023-07-17 PROCEDURE — 3078F DIAST BP <80 MM HG: CPT | Performed by: FAMILY MEDICINE

## 2023-07-17 PROCEDURE — 3074F SYST BP LT 130 MM HG: CPT | Performed by: FAMILY MEDICINE

## 2023-07-17 NOTE — PROGRESS NOTES
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 See form scanned under, \".\"      UA showed trace blood - advised f/u PCP  "

## 2023-09-06 ENCOUNTER — OCCUPATIONAL MEDICINE (OUTPATIENT)
Dept: OCCUPATIONAL MEDICINE | Facility: CLINIC | Age: 48
End: 2023-09-06
Payer: COMMERCIAL

## 2023-09-06 VITALS
WEIGHT: 250.4 LBS | RESPIRATION RATE: 16 BRPM | HEIGHT: 75 IN | TEMPERATURE: 97.3 F | BODY MASS INDEX: 31.13 KG/M2 | OXYGEN SATURATION: 98 % | DIASTOLIC BLOOD PRESSURE: 66 MMHG | SYSTOLIC BLOOD PRESSURE: 128 MMHG | HEART RATE: 64 BPM

## 2023-09-06 DIAGNOSIS — M54.12 CERVICAL RADICULOPATHY: ICD-10-CM

## 2023-09-06 DIAGNOSIS — F07.81 POSTCONCUSSIVE SYNDROME: ICD-10-CM

## 2023-09-06 PROCEDURE — 99213 OFFICE O/P EST LOW 20 MIN: CPT | Performed by: PREVENTIVE MEDICINE

## 2023-09-06 PROCEDURE — 3074F SYST BP LT 130 MM HG: CPT | Performed by: PREVENTIVE MEDICINE

## 2023-09-06 PROCEDURE — 3078F DIAST BP <80 MM HG: CPT | Performed by: PREVENTIVE MEDICINE

## 2023-09-06 NOTE — PROGRESS NOTES
"Subjective:     Manuel Thomason is a 47 y.o. 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  He struck his head on the metal frame of the low doorway and had an immediate syncopal episode that was observed by coworkers.       Prior treatments: Vestibular therapy, ibuprofen, diclofenac, activity modification and tizanidine.    Continues to note some hearing loss on the left side as well as some blurry vision on the left.  He states that the referral for ENT was denied again.  He has been working full duty.  He has been able to do this without issue.  He states he received a settlement from [facility], he is going over with his  and is unsure if he is going to take it.  He states that the treatments for cervical spine are getting denied as well.    ROS           Objective:     /66 (BP Location: Left arm, Patient Position: Sitting, BP Cuff Size: Adult long)   Pulse 64   Temp 36.3 °C (97.3 °F) (Temporal)   Resp 16   Ht 1.905 m (6' 3\")   Wt 114 kg (250 lb 6.4 oz)   SpO2 98%   BMI 31.30 kg/m²     Constitutional: Patient is in no acute distress. Appears well-developed and well-nourished.   Cardiovascular: Normal rate.    Pulmonary/Chest: Effort normal. No respiratory distress.   Neurological: Patient is alert and oriented to person, place, and time.   Skin: Skin is warm and dry.   Psychiatric: Normal mood and affect. Behavior is normal.     Neuro: Alert and oriented x3.  Cranial nerves grossly intact.    Cervical: No gross deformity.  Limited range of motion with rotation.  Some limited range of motion of the right shoulder as well.  HEENT: PERRLA.  EOMI.       CT head: No acute deformity  MRI cervical: Mild neuroforaminal narrowing at C4 3-4, C4-5 and C5-6.  No significant difference from prior exam.    Assessment/Plan:       1. " Postconcussive syndrome    2. Cervical radiculopathy    Released to Full Duty FROM 9/6/2023 TO 11/16/2023       Referral for neurosurgery - Dr. Dorado, pending approval  Referral for ENT, denied  Ibuprofen 800 mg 3 times daily as needed  May take with Tylenol 1000 mg 3 times daily as needed  Full duty  Follow-up 2 to 3 months    Differential diagnosis, natural history, supportive care, and indications for immediate follow-up discussed.    Approximately 25 minutes was spent in preparing for visit, obtaining history, exam and evaluation, patient counseling/education and post visit documentation/orders.

## 2023-09-06 NOTE — LETTER
[address],   [address] [place] [address]  Phone:  [phone] - Fax:  [phone]   Occupational Health [facility] Progress Report and Disability Certification  Date of Service: 9/6/2023   No Show:  No  Date / Time of Next Visit: 11/16/2023 @ 9:15 AM   Claim Information   Patient Name: Manuel Thomason  Claim Number:     Employer: [employer]  Date of Injury: 10/20/2022     Insurer / TPA: Irma Golden  ID / SSN:     Occupation: Package   Diagnosis: Diagnoses of Postconcussive syndrome and Cervical radiculopathy were pertinent to this visit.    Medical Information   Related to Industrial Injury? 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  He struck his head on the metal frame of the low doorway and had an immediate syncopal episode that was observed by coworkers.       Prior treatments: Vestibular therapy, ibuprofen, diclofenac, activity modification and tizanidine.    Continues to note some hearing loss on the left side as well as some blurry vision on the left.  He states that the referral for ENT was denied again.  He has been working full duty.  He has been able to do this without issue.  He states he received a settlement from [facility], he is going over with his  and is unsure if he is going to take it.  He states that the treatments for cervical spine are getting denied as well.   Objective Findings: Neuro: Alert and oriented x3.  Cranial nerves grossly intact.    Cervical: No gross deformity.  Limited range of motion with rotation.  Some limited range of motion of the right shoulder as well.  HEENT: PERRLA.  EOMI.       CT head: No acute deformity  MRI cervical: Mild neuroforaminal narrowing at C4 3-4, C4-5 and C5-6.  No significant difference from prior exam.   Pre-Existing Condition(s):     Assessment:   Condition Same     Status: Additional Care Required  Permanent Disability:No    Plan:      Diagnostics:      Comments:  Referral for neurosurgery - Dr. Dorado, pending approval  Referral for ENT, denied  Ibuprofen 800 mg 3 times daily as needed  May take with Tylenol 1000 mg 3 times daily as needed  Full duty  Follow-up 2 to 3 months    Disability Information   Status: Released to Full Duty    From:  9/6/2023  Through: 11/16/2023 Restrictions are:     Physical Restrictions   Sitting:    Standing:    Stooping:    Bending:      Squatting:    Walking:    Climbing:    Pushing:      Pulling:    Other:    Reaching Above Shoulder (L):   Reaching Above Shoulder (R):       Reaching Below Shoulder (L):    Reaching Below Shoulder (R):      Not to exceed Weight Limits   Carrying(hrs):   Weight Limit(lb):   Lifting(hrs):   Weight  Limit(lb):     Comments:      Repetitive Actions   Hands: i.e. Fine Manipulations from Grasping:     Feet: i.e. Operating Foot Controls:     Driving / Operate Machinery:     Health Care Provider’s Original or Electronic Signature  Tien Nicholson D.O. Health Care Provider’s Original or Electronic Signature    Tien Nicholson DO MPH     Clinic Name / Location: [address],   [address] [place] [address] Clinic Phone Number: Dept: [phone]   Appointment Time: 11:15 Am Visit Start Time: 11:25 AM   Check-In Time:  11:18 Am Visit Discharge Time:  11:49 AM   Original-Treating Physician or Chiropractor    Page 2-Insurer/TPA    Page 3-Employer    Page 4-Employee

## 2023-10-02 ENCOUNTER — HOSPITAL ENCOUNTER (OUTPATIENT)
Dept: RADIOLOGY | Facility: MEDICAL CENTER | Age: 48
End: 2023-10-02
Attending: STUDENT IN AN ORGANIZED HEALTH CARE EDUCATION/TRAINING PROGRAM
Payer: COMMERCIAL

## 2023-10-02 DIAGNOSIS — M25.511 RIGHT SHOULDER PAIN, UNSPECIFIED CHRONICITY: ICD-10-CM

## 2023-10-02 DIAGNOSIS — M25.512 LEFT SHOULDER PAIN, UNSPECIFIED CHRONICITY: ICD-10-CM

## 2023-10-02 PROCEDURE — 73221 MRI JOINT UPR EXTREM W/O DYE: CPT | Mod: LT

## 2023-10-02 PROCEDURE — 73221 MRI JOINT UPR EXTREM W/O DYE: CPT | Mod: RT
